# Patient Record
Sex: FEMALE | Race: BLACK OR AFRICAN AMERICAN | NOT HISPANIC OR LATINO
[De-identification: names, ages, dates, MRNs, and addresses within clinical notes are randomized per-mention and may not be internally consistent; named-entity substitution may affect disease eponyms.]

---

## 2017-08-10 ENCOUNTER — APPOINTMENT (OUTPATIENT)
Dept: SURGERY | Facility: CLINIC | Age: 72
End: 2017-08-10

## 2017-08-10 VITALS
WEIGHT: 200 LBS | OXYGEN SATURATION: 99 % | SYSTOLIC BLOOD PRESSURE: 154 MMHG | DIASTOLIC BLOOD PRESSURE: 70 MMHG | HEIGHT: 64 IN | HEART RATE: 73 BPM | BODY MASS INDEX: 34.15 KG/M2 | TEMPERATURE: 98.6 F

## 2020-01-09 ENCOUNTER — RESULT REVIEW (OUTPATIENT)
Age: 75
End: 2020-01-09

## 2021-05-19 ENCOUNTER — APPOINTMENT (OUTPATIENT)
Dept: OPHTHALMOLOGY | Facility: CLINIC | Age: 76
End: 2021-05-19

## 2021-09-14 ENCOUNTER — INPATIENT (INPATIENT)
Facility: HOSPITAL | Age: 76
LOS: 1 days | Discharge: HOME | DRG: 379 | End: 2021-09-16
Attending: STUDENT IN AN ORGANIZED HEALTH CARE EDUCATION/TRAINING PROGRAM | Admitting: INTERNAL MEDICINE
Payer: MEDICARE

## 2021-09-14 VITALS
WEIGHT: 186.95 LBS | HEIGHT: 64 IN | TEMPERATURE: 98 F | OXYGEN SATURATION: 100 % | SYSTOLIC BLOOD PRESSURE: 167 MMHG | HEART RATE: 82 BPM | RESPIRATION RATE: 16 BRPM | DIASTOLIC BLOOD PRESSURE: 79 MMHG

## 2021-09-14 DIAGNOSIS — E11.9 TYPE 2 DIABETES MELLITUS WITHOUT COMPLICATIONS: ICD-10-CM

## 2021-09-14 DIAGNOSIS — R63.8 OTHER SYMPTOMS AND SIGNS CONCERNING FOOD AND FLUID INTAKE: ICD-10-CM

## 2021-09-14 DIAGNOSIS — D64.9 ANEMIA, UNSPECIFIED: ICD-10-CM

## 2021-09-14 DIAGNOSIS — N18.9 CHRONIC KIDNEY DISEASE, UNSPECIFIED: ICD-10-CM

## 2021-09-14 DIAGNOSIS — K92.2 GASTROINTESTINAL HEMORRHAGE, UNSPECIFIED: ICD-10-CM

## 2021-09-14 DIAGNOSIS — I10 ESSENTIAL (PRIMARY) HYPERTENSION: ICD-10-CM

## 2021-09-14 LAB
ALBUMIN SERPL ELPH-MCNC: 3.5 G/DL — SIGNIFICANT CHANGE UP (ref 3.3–5)
ALP SERPL-CCNC: 57 U/L — SIGNIFICANT CHANGE UP (ref 40–120)
ALT FLD-CCNC: 16 U/L — SIGNIFICANT CHANGE UP (ref 10–45)
ANION GAP SERPL CALC-SCNC: 10 MMOL/L — SIGNIFICANT CHANGE UP (ref 5–17)
APTT BLD: 33.6 SEC — SIGNIFICANT CHANGE UP (ref 27.5–35.5)
AST SERPL-CCNC: 24 U/L — SIGNIFICANT CHANGE UP (ref 10–40)
BASOPHILS # BLD AUTO: 0.02 K/UL — SIGNIFICANT CHANGE UP (ref 0–0.2)
BASOPHILS NFR BLD AUTO: 0.2 % — SIGNIFICANT CHANGE UP (ref 0–2)
BILIRUB SERPL-MCNC: 0.2 MG/DL — SIGNIFICANT CHANGE UP (ref 0.2–1.2)
BLD GP AB SCN SERPL QL: NEGATIVE — SIGNIFICANT CHANGE UP
BUN SERPL-MCNC: 32 MG/DL — HIGH (ref 7–23)
CALCIUM SERPL-MCNC: 9 MG/DL — SIGNIFICANT CHANGE UP (ref 8.4–10.5)
CHLORIDE SERPL-SCNC: 108 MMOL/L — SIGNIFICANT CHANGE UP (ref 96–108)
CO2 SERPL-SCNC: 23 MMOL/L — SIGNIFICANT CHANGE UP (ref 22–31)
CREAT SERPL-MCNC: 2.16 MG/DL — HIGH (ref 0.5–1.3)
EOSINOPHIL # BLD AUTO: 0.11 K/UL — SIGNIFICANT CHANGE UP (ref 0–0.5)
EOSINOPHIL NFR BLD AUTO: 1.2 % — SIGNIFICANT CHANGE UP (ref 0–6)
GLUCOSE BLDC GLUCOMTR-MCNC: 239 MG/DL — HIGH (ref 70–99)
GLUCOSE SERPL-MCNC: 85 MG/DL — SIGNIFICANT CHANGE UP (ref 70–99)
HCT VFR BLD CALC: 32.3 % — LOW (ref 34.5–45)
HGB BLD-MCNC: 9.8 G/DL — LOW (ref 11.5–15.5)
IMM GRANULOCYTES NFR BLD AUTO: 0.3 % — SIGNIFICANT CHANGE UP (ref 0–1.5)
INR BLD: 0.95 — SIGNIFICANT CHANGE UP (ref 0.88–1.16)
LACTATE SERPL-SCNC: 1.3 MMOL/L — SIGNIFICANT CHANGE UP (ref 0.5–2)
LYMPHOCYTES # BLD AUTO: 2.02 K/UL — SIGNIFICANT CHANGE UP (ref 1–3.3)
LYMPHOCYTES # BLD AUTO: 21.1 % — SIGNIFICANT CHANGE UP (ref 13–44)
MCHC RBC-ENTMCNC: 26.7 PG — LOW (ref 27–34)
MCHC RBC-ENTMCNC: 30.3 GM/DL — LOW (ref 32–36)
MCV RBC AUTO: 88 FL — SIGNIFICANT CHANGE UP (ref 80–100)
MONOCYTES # BLD AUTO: 0.41 K/UL — SIGNIFICANT CHANGE UP (ref 0–0.9)
MONOCYTES NFR BLD AUTO: 4.3 % — SIGNIFICANT CHANGE UP (ref 2–14)
NEUTROPHILS # BLD AUTO: 6.97 K/UL — SIGNIFICANT CHANGE UP (ref 1.8–7.4)
NEUTROPHILS NFR BLD AUTO: 72.9 % — SIGNIFICANT CHANGE UP (ref 43–77)
NRBC # BLD: 0 /100 WBCS — SIGNIFICANT CHANGE UP (ref 0–0)
PLATELET # BLD AUTO: 325 K/UL — SIGNIFICANT CHANGE UP (ref 150–400)
POTASSIUM SERPL-MCNC: 4.7 MMOL/L — SIGNIFICANT CHANGE UP (ref 3.5–5.3)
POTASSIUM SERPL-SCNC: 4.7 MMOL/L — SIGNIFICANT CHANGE UP (ref 3.5–5.3)
PROT SERPL-MCNC: 6.6 G/DL — SIGNIFICANT CHANGE UP (ref 6–8.3)
PROTHROM AB SERPL-ACNC: 11.4 SEC — SIGNIFICANT CHANGE UP (ref 10.6–13.6)
RBC # BLD: 3.67 M/UL — LOW (ref 3.8–5.2)
RBC # FLD: 17 % — HIGH (ref 10.3–14.5)
RH IG SCN BLD-IMP: POSITIVE — SIGNIFICANT CHANGE UP
SARS-COV-2 RNA SPEC QL NAA+PROBE: NEGATIVE — SIGNIFICANT CHANGE UP
SODIUM SERPL-SCNC: 141 MMOL/L — SIGNIFICANT CHANGE UP (ref 135–145)
WBC # BLD: 9.56 K/UL — SIGNIFICANT CHANGE UP (ref 3.8–10.5)
WBC # FLD AUTO: 9.56 K/UL — SIGNIFICANT CHANGE UP (ref 3.8–10.5)

## 2021-09-14 PROCEDURE — 99222 1ST HOSP IP/OBS MODERATE 55: CPT

## 2021-09-14 PROCEDURE — 99285 EMERGENCY DEPT VISIT HI MDM: CPT

## 2021-09-14 PROCEDURE — 99223 1ST HOSP IP/OBS HIGH 75: CPT | Mod: GC

## 2021-09-14 PROCEDURE — 71045 X-RAY EXAM CHEST 1 VIEW: CPT | Mod: 26

## 2021-09-14 RX ORDER — DEXTROSE 50 % IN WATER 50 %
12.5 SYRINGE (ML) INTRAVENOUS ONCE
Refills: 0 | Status: DISCONTINUED | OUTPATIENT
Start: 2021-09-14 | End: 2021-09-16

## 2021-09-14 RX ORDER — DEXTROSE 50 % IN WATER 50 %
25 SYRINGE (ML) INTRAVENOUS ONCE
Refills: 0 | Status: DISCONTINUED | OUTPATIENT
Start: 2021-09-14 | End: 2021-09-16

## 2021-09-14 RX ORDER — DEXTROSE 50 % IN WATER 50 %
15 SYRINGE (ML) INTRAVENOUS ONCE
Refills: 0 | Status: DISCONTINUED | OUTPATIENT
Start: 2021-09-14 | End: 2021-09-16

## 2021-09-14 RX ORDER — INFLUENZA VIRUS VACCINE 15; 15; 15; 15 UG/.5ML; UG/.5ML; UG/.5ML; UG/.5ML
0.7 SUSPENSION INTRAMUSCULAR ONCE
Refills: 0 | Status: DISCONTINUED | OUTPATIENT
Start: 2021-09-14 | End: 2021-09-16

## 2021-09-14 RX ORDER — PANTOPRAZOLE SODIUM 20 MG/1
40 TABLET, DELAYED RELEASE ORAL EVERY 12 HOURS
Refills: 0 | Status: DISCONTINUED | OUTPATIENT
Start: 2021-09-15 | End: 2021-09-15

## 2021-09-14 RX ORDER — SODIUM CHLORIDE 9 MG/ML
1000 INJECTION, SOLUTION INTRAVENOUS
Refills: 0 | Status: DISCONTINUED | OUTPATIENT
Start: 2021-09-14 | End: 2021-09-16

## 2021-09-14 RX ORDER — INSULIN GLARGINE 100 [IU]/ML
28 INJECTION, SOLUTION SUBCUTANEOUS AT BEDTIME
Refills: 0 | Status: DISCONTINUED | OUTPATIENT
Start: 2021-09-14 | End: 2021-09-15

## 2021-09-14 RX ORDER — PANTOPRAZOLE SODIUM 20 MG/1
40 TABLET, DELAYED RELEASE ORAL ONCE
Refills: 0 | Status: COMPLETED | OUTPATIENT
Start: 2021-09-14 | End: 2021-09-14

## 2021-09-14 RX ORDER — GLUCAGON INJECTION, SOLUTION 0.5 MG/.1ML
1 INJECTION, SOLUTION SUBCUTANEOUS ONCE
Refills: 0 | Status: DISCONTINUED | OUTPATIENT
Start: 2021-09-14 | End: 2021-09-16

## 2021-09-14 RX ORDER — INFLUENZA VIRUS VACCINE 15; 15; 15; 15 UG/.5ML; UG/.5ML; UG/.5ML; UG/.5ML
0.5 SUSPENSION INTRAMUSCULAR ONCE
Refills: 0 | Status: DISCONTINUED | OUTPATIENT
Start: 2021-09-14 | End: 2021-09-14

## 2021-09-14 RX ORDER — PANTOPRAZOLE SODIUM 20 MG/1
8 TABLET, DELAYED RELEASE ORAL
Qty: 80 | Refills: 0 | Status: DISCONTINUED | OUTPATIENT
Start: 2021-09-14 | End: 2021-09-14

## 2021-09-14 RX ORDER — INSULIN LISPRO 100/ML
VIAL (ML) SUBCUTANEOUS
Refills: 0 | Status: DISCONTINUED | OUTPATIENT
Start: 2021-09-14 | End: 2021-09-16

## 2021-09-14 RX ORDER — SODIUM CHLORIDE 9 MG/ML
1000 INJECTION INTRAMUSCULAR; INTRAVENOUS; SUBCUTANEOUS
Refills: 0 | Status: COMPLETED | OUTPATIENT
Start: 2021-09-14 | End: 2021-09-14

## 2021-09-14 RX ADMIN — PANTOPRAZOLE SODIUM 40 MILLIGRAM(S): 20 TABLET, DELAYED RELEASE ORAL at 17:44

## 2021-09-14 RX ADMIN — SODIUM CHLORIDE 150 MILLILITER(S): 9 INJECTION INTRAMUSCULAR; INTRAVENOUS; SUBCUTANEOUS at 20:21

## 2021-09-14 RX ADMIN — INSULIN GLARGINE 28 UNIT(S): 100 INJECTION, SOLUTION SUBCUTANEOUS at 22:55

## 2021-09-14 RX ADMIN — PANTOPRAZOLE SODIUM 10 MG/HR: 20 TABLET, DELAYED RELEASE ORAL at 20:20

## 2021-09-14 RX ADMIN — Medication 4: at 22:55

## 2021-09-14 NOTE — H&P ADULT - ASSESSMENT
75 F PMHx DM, HTN, CKD Stage III, HLD, Osteoporosis presenting to [facility] as per outpatient Gastroenterologist, Dr Julio Cesar Cox for abnormal EGD/Cscope performed today, 9/14. She is being admitted for colonoscopy and further management of GI bleed.

## 2021-09-14 NOTE — H&P ADULT - ATTENDING COMMENTS
reviewed pertinent data , h&p  patient seen and examined overnight     pt in NAD, awake, alert, reported abdominal cramping and nausea in AM , mmm, s1s2, lungs cta b/l; abdomen soft/nt/nd; no lower ext edema/tenderness b/l     1. melena w/ concerning findings on outside EGD/ colonoscopy w/ increased polyposis compared to previous colonoscopy. denies melena o/n; monitor h/h; NPO o/n pending EGD. on IV PPI o/n. followup GI recs and path       rest of  plan as above

## 2021-09-14 NOTE — PROGRESS NOTE ADULT - ASSESSMENT
75 F PMHx DM, HTN, CKD Stage III, HLD, Osteoporosis presenting to [facility] as per outpatient Gastroenterologist, Dr Julio Cesar Cox for abnormal EGD/Cscope performed today, 9/14, findings concerning for malignancy in the setting of reported history of JOVANI, GI consulted for abnormal EGD/C-scope.    #Melena  #Anemia  Patient presenting with 1 month history of anemia, reported to JOVANI in outpatient setting in the setting of melena and recent unintentional weight loss. EGD/Cscope evaluation notable for polyposis in stomach and colon, highly suspicious for malignant process.   -February 2020 which was notable for a 1 cm ascending colon polyp that was removed but not retrieved as well as a 2 mm ascending colon tubular adenoma. Recalls that she was advised to have a repeat C-scope in 3 years time.  -EGD 9/14/2021: polyposis noted in cardia, fundus, in the greater curvature noted to have a semi-sessile/semi-flat, ulcerated mass of 3.0 cm with scant spontaneous hemorrhage along the greater curvature of gastric body. An attempt was made to raise the mass with submucosal saline injection, unsuccessful. Multiple biopsies obtained. Polyposis noted in gastric body, with approx 40 hemorrhagic appearing polyps ranging in size from 3 mm to 1.5 cm noted. Pedunculated 1.2 cm polyp removed by hot snare in antrum.   -C-scope 9/14/2021: Colorectal polyposis, mainly noted in transverse colon. Significant melena noted. Ascending colon lipoma noted.   -NPO after MN for repeat EGD to re-assess need for further hemostasis  -f/u pathology from outpatient GI provider, Dr Julio Cesar Cxo ([facility])   -Please obtain AM Coags  -Obtain Fe studies (Fe, TIBC, Transferrin, ferritin)  -Protonix 40 mg IVP BID     Case discussed with ED attending.    Nisreen Lopez DO  Gastroenterology Fellow  Pager: [phone] 75 F PMHx DM, HTN, CKD Stage III, HLD, Osteoporosis presenting to [facility] as per outpatient Gastroenterologist, Dr Julio Cesar Cox for abnormal EGD/Cscope performed today, 9/14, findings concerning for malignancy in the setting of reported history of JOVANI, GI consulted for abnormal EGD/C-scope.    #Melena  #Anemia  Patient presenting with 1 month history of anemia, reported to JOVANI in outpatient setting in the setting of melena and recent unintentional weight loss. EGD/Cscope evaluation notable for polyposis in stomach and colon, highly suspicious for malignant process.   -February 2020 which was notable for a 1 cm ascending colon polyp that was removed but not retrieved as well as a 2 mm ascending colon tubular adenoma. Recalls that she was advised to have a repeat C-scope in 3 years time.  -EGD 9/14/2021: polyposis noted in cardia, fundus, in the greater curvature noted to have a semi-sessile/semi-flat, ulcerated mass of 3.0 cm with scant spontaneous hemorrhage along the greater curvature of gastric body. An attempt was made to raise the mass with submucosal saline injection, unsuccessful. Multiple biopsies obtained. Polyposis noted in gastric body, with approx 40 hemorrhagic appearing polyps ranging in size from 3 mm to 1.5 cm noted. Pedunculated 1.2 cm polyp removed by hot snare in antrum.   -C-scope 9/14/2021: Colorectal polyposis, mainly noted in transverse colon. Significant melena noted. Ascending colon lipoma noted.   -NPO after MN for repeat EGD to re-assess need for further hemostasis  -f/u pathology from outpatient GI provider, Dr Julio Cesar Cox ([facility])   -Please obtain AM Coags  -Obtain Fe studies (Fe, TIBC, Transferrin, ferritin)  -Monitor H/H  -Transfusion goal as per primary team   -Maintain active T&S  -Protonix 40 mg IVP BID     Case discussed with ED attending.    Nisreen Lopez DO  Gastroenterology Fellow  Pager: [phone]

## 2021-09-14 NOTE — H&P ADULT - PROBLEM SELECTOR PLAN 6
F: none  E: replete prn  N: NPO after midnight  A: none   GI ppx: pantoprazole 40 mg ggt F: none  E: replete prn  N: NPO after midnight  A: none   GI ppx: pantoprazole 40 mg BID

## 2021-09-14 NOTE — H&P ADULT - PROBLEM SELECTOR PLAN 1
Pt has 1 month h/o melena and constipation; 3 month h/o Fe def anemia   -February 2020 which was notable for a 1 cm ascending colon polyp that was removed but not retrieved as well as a 2 mm ascending colon tubular adenoma.  -EGD 9/14/2021: polyposis noted in cardia, fundus, in the greater curvature noted to have a semi-sessile/semi-flat, ulcerated mass of 3.0 cm with scant spontaneous hemorrhage along the greater curvature of gastric body.  -C-scope 9/14/2021: Colorectal polyposis, mainly noted in transverse colon. Significant melena noted. Ascending colon lipoma noted.   - Hb 9.8    TO DO:   - Maintain active type and screen, trend Hb and transfuse prn   -NPO after MN for repeat EGD to re-assess need for further hemostasis  -f/u pathology from outpatient GI provider, Dr Julio Cesar Cox ([facility])   -f/u AM Coags, iron studies   -cw Protonix 40 mg IVP BID

## 2021-09-14 NOTE — H&P ADULT - NSHPSOCIALHISTORY_GEN_ALL_CORE
Pt lives with her daughter, has a smoking h/o in the past and quit smoking in 1994. She has no difficulty with her activities of daily living.

## 2021-09-14 NOTE — H&P ADULT - NSHPPHYSICALEXAM_GEN_ALL_CORE
PHYSICAL EXAM: Sitting comfortably on the bed and does not appear to be in any distress    Constitutional: WDWN  HEENT: NC/AT, non icteric sclera, PEERL  Neck: supple, no JVD  Respiratory: cta b/l  Cardiovascular: s1 s2 present, no murmurs   Gastrointestinal: soft, ND NT, BS x4  Extremities: pulses present b/l, no cyanosis, no edema, no clubbing  Neurological: AAO x3, no focal neurological deficit

## 2021-09-14 NOTE — ED PROVIDER NOTE - GASTROINTESTINAL, MLM
Rectal exam deferred due to images of melena accompanying pt from GI suite. Belly soft nontender, nondistended.  Rectal exam deferred due to images of melena accompanying pt from GI suite.

## 2021-09-14 NOTE — H&P ADULT - HISTORY OF PRESENT ILLNESS
75 F PMHx DM, HTN, CKD Stage III, HLD, Osteoporosis presenting to [facility] as per outpatient Gastroenterologist, Dr Julio Cesar Cox for abnormal EGD/Cscope performed today, 9/14. Patient's history dates back to approximately 3 months ago when she was first told she was iron deficient and noted to have a drop in her Hgb by hematologist Dr. Lino Oquendo. Patient does not recall what her Hgb level had dropped down to at the time but recalls receiving at least 2-3 iron infusions. Patient at the time denies any dark stools. Patient notes that leading up to that point, she had at least 3 colonoscopies before, the last of which was performed February 2020 which was notable for a 1 cm ascending colon polyp that was removed but not retrieved as well as a 2 mm ascending colon tubular adenoma. Recalls that she was advised to have a repeat C-scope in 3 years time. Approximately 1 month ago, she reports having dark/black stools, reported to be tarry in consistency. She states that she is consistently having black stool however, today her stool was relatively less black. She Denies any NSAID use, only reports taking ASA 81 mg daily and endorses chronic constipation. Around the same time, also reports having decreased PO intake and losing approximately 10 lbs, unintentional. Denies any associated abdominal pain, nausea/vomiting, early satiety.     Home meds: Atorvastatin 20mg daily, Auryxia 210 mg BID, Hydralazine 25 mg daily, Metoprolol 50 mg daily, Nifedipine 30 mg daily, Tresiba 56U daily, Novolog 8-10U before meals, Vit D3 1000U, Baby aspirin 81 mg, Vit C, Furosemide 40 mg every other day, losartan potassium 50 mg daily    In the ED:  Vitals: 167/79, HR 82, T 98.4, RR 16  Labs: Hb 9.8, WBC 9.56, Na 141, K 4.7, BUN 32, Cr 2.16, INR 0.95  CXR: The lungs are clear.    The patient was admitted for colonoscopic evaluation by GI.    9 75 F PMHx DM, HTN, CKD Stage III, HLD, Osteoporosis presenting to [facility] as per outpatient Gastroenterologist, Dr Julio Cesar Cox for abnormal EGD/Cscope performed today, 9/14. Patient's history dates back to approximately 3 months ago when she was first told she was iron deficient and noted to have a drop in her Hgb by hematologist Dr. Lino Oquendo. Patient does not recall what her Hgb level had dropped down to at the time but recalls receiving at least 2-3 iron infusions. Patient at the time denies any dark stools. Patient notes that leading up to that point, she had at least 3 colonoscopies before, the last of which was performed February 2020 which was notable for a 1 cm ascending colon polyp that was removed but not retrieved as well as a 2 mm ascending colon tubular adenoma. Recalls that she was advised to have a repeat C-scope in 3 years time. Approximately 1 month ago, she reports having dark/black stools, reported to be tarry in consistency. She states that she is consistently having black stool however, today her stool was relatively less black. She Denies any NSAID use, only reports taking ASA 81 mg daily and endorses chronic constipation. Around the same time, also reports having decreased PO intake and losing approximately 10 lbs, unintentional. Denies any associated abdominal pain, nausea/vomiting, early satiety.     Home meds: Atorvastatin 20mg daily, Auryxia 210 mg BID, Hydralazine 25 mg daily, Metoprolol 50 mg daily, Nifedipine 30 mg daily, Tresiba 56U daily, Novolog 8-10U before meals, Vit D3 1000U, Baby aspirin 81 mg, Vit C, Furosemide 40 mg every other day, losartan potassium 50 mg daily    In the ED:  Vitals: /79, HR 82, T 98.4, RR 16  Labs: Hb 9.8, WBC 9.56, Na 141, K 4.7, BUN 32, Cr 2.16, INR 0.95  CXR: The lungs are clear.    The patient was admitted for colonoscopic evaluation by GI.

## 2021-09-14 NOTE — H&P ADULT - NSHPLABSRESULTS_GEN_ALL_CORE
.  LABS:                         9.8    9.56  )-----------( 325      ( 14 Sep 2021 14:41 )             32.3     09-14    141  |  108  |  32<H>  ----------------------------<  85  4.7   |  23  |  2.16<H>    Ca    9.0      14 Sep 2021 14:40    TPro  6.6  /  Alb  3.5  /  TBili  0.2  /  DBili  x   /  AST  24  /  ALT  16  /  AlkPhos  57  09-14    PT/INR - ( 14 Sep 2021 14:40 )   PT: 11.4 sec;   INR: 0.95          PTT - ( 14 Sep 2021 14:40 )  PTT:33.6 sec          Lactate, Blood: 1.3 mmol/L (09-14 @ 14:40)      RADIOLOGY, EKG & ADDITIONAL TESTS: CXR: The lungs are clear.

## 2021-09-14 NOTE — H&P ADULT - PROBLEM SELECTOR PLAN 2
Pt is taking home Furosemide 40 mg every other day, losartan potassium 50 mg daily, Hydralazine 25 mg daily, Metoprolol 50 mg daily, Nifedipine 30 mg daily    TO DO:  - continue holding antihypertensives in the setting of GI bleed

## 2021-09-14 NOTE — ED PROVIDER NOTE - OBJECTIVE STATEMENT
76 y/o F w/ Hx of DM2, CKD, former smoker, anemia, sent from gastroenterology office after pt received endoscopy and colonoscopy today, noting significant melena in various parts of the the colon and small amounts of upper GI polyp bleeding. Pt denies symptoms including abd pain, N/V, fever, chills, weakness, SOB. Pt does not drink alcohol.

## 2021-09-14 NOTE — ED ADULT TRIAGE NOTE - CHIEF COMPLAINT QUOTE
Pt brought in by EMS from endoscopy/colonoscopy suite for confirmed acute gastrointestinal bleeding. Pt reports black tarry stool recently. Denies abdominal pain, n/v/d. Not on blood thinners.

## 2021-09-14 NOTE — ED ADULT NURSE NOTE - OBJECTIVE STATEMENT
pt presents to ED via ambulance on stretcher for confirmed GI bleed.  Pt is coming from having endoscopy and colonscopy completed today at outpatient.  Pt reports having endoscopy due to dark tarry stools she has been having for the last month.  PT reports having some generalized weakness with it.  Pt denies any abd pain, nausea, vomiting, diarrhea, cp, sob.  No hx of GI bleed. Pt is not on blood thinners.

## 2021-09-14 NOTE — H&P ADULT - PROBLEM SELECTOR PLAN 5
Pt presented with Hb 9.8 and has a h/o and treatment for iron def anemia outpatient.    TO DO:   - Maintain active type and screen, trend Hb and transfuse prn

## 2021-09-15 ENCOUNTER — TRANSCRIPTION ENCOUNTER (OUTPATIENT)
Age: 76
End: 2021-09-15

## 2021-09-15 DIAGNOSIS — Z98.51 TUBAL LIGATION STATUS: Chronic | ICD-10-CM

## 2021-09-15 DIAGNOSIS — Z90.49 ACQUIRED ABSENCE OF OTHER SPECIFIED PARTS OF DIGESTIVE TRACT: Chronic | ICD-10-CM

## 2021-09-15 LAB
A1C WITH ESTIMATED AVERAGE GLUCOSE RESULT: 5.9 % — HIGH (ref 4–5.6)
ANION GAP SERPL CALC-SCNC: 8 MMOL/L — SIGNIFICANT CHANGE UP (ref 5–17)
APTT BLD: 30.7 SEC — SIGNIFICANT CHANGE UP (ref 27.5–35.5)
BASOPHILS # BLD AUTO: 0.02 K/UL — SIGNIFICANT CHANGE UP (ref 0–0.2)
BASOPHILS NFR BLD AUTO: 0.3 % — SIGNIFICANT CHANGE UP (ref 0–2)
BLD GP AB SCN SERPL QL: NEGATIVE — SIGNIFICANT CHANGE UP
BUN SERPL-MCNC: 31 MG/DL — HIGH (ref 7–23)
CALCIUM SERPL-MCNC: 8.7 MG/DL — SIGNIFICANT CHANGE UP (ref 8.4–10.5)
CHLORIDE SERPL-SCNC: 115 MMOL/L — HIGH (ref 96–108)
CO2 SERPL-SCNC: 24 MMOL/L — SIGNIFICANT CHANGE UP (ref 22–31)
COVID-19 SPIKE DOMAIN AB INTERP: POSITIVE
COVID-19 SPIKE DOMAIN ANTIBODY RESULT: 198 U/ML — HIGH
CREAT SERPL-MCNC: 2.02 MG/DL — HIGH (ref 0.5–1.3)
EOSINOPHIL # BLD AUTO: 0.14 K/UL — SIGNIFICANT CHANGE UP (ref 0–0.5)
EOSINOPHIL NFR BLD AUTO: 1.8 % — SIGNIFICANT CHANGE UP (ref 0–6)
ESTIMATED AVERAGE GLUCOSE: 123 MG/DL — HIGH (ref 68–114)
FERRITIN SERPL-MCNC: 157 NG/ML — HIGH (ref 15–150)
GLUCOSE BLDC GLUCOMTR-MCNC: 103 MG/DL — HIGH (ref 70–99)
GLUCOSE BLDC GLUCOMTR-MCNC: 107 MG/DL — HIGH (ref 70–99)
GLUCOSE BLDC GLUCOMTR-MCNC: 118 MG/DL — HIGH (ref 70–99)
GLUCOSE BLDC GLUCOMTR-MCNC: 167 MG/DL — HIGH (ref 70–99)
GLUCOSE BLDC GLUCOMTR-MCNC: 286 MG/DL — HIGH (ref 70–99)
GLUCOSE BLDC GLUCOMTR-MCNC: 63 MG/DL — LOW (ref 70–99)
GLUCOSE BLDC GLUCOMTR-MCNC: 69 MG/DL — LOW (ref 70–99)
GLUCOSE SERPL-MCNC: 90 MG/DL — SIGNIFICANT CHANGE UP (ref 70–99)
HCT VFR BLD CALC: 28.2 % — LOW (ref 34.5–45)
HGB BLD-MCNC: 8.4 G/DL — LOW (ref 11.5–15.5)
IMM GRANULOCYTES NFR BLD AUTO: 0.8 % — SIGNIFICANT CHANGE UP (ref 0–1.5)
INR BLD: 0.89 — SIGNIFICANT CHANGE UP (ref 0.88–1.16)
IRON SATN MFR SERPL: 35 % — SIGNIFICANT CHANGE UP (ref 14–50)
IRON SATN MFR SERPL: 49 UG/DL — SIGNIFICANT CHANGE UP (ref 30–160)
LYMPHOCYTES # BLD AUTO: 1.25 K/UL — SIGNIFICANT CHANGE UP (ref 1–3.3)
LYMPHOCYTES # BLD AUTO: 16.1 % — SIGNIFICANT CHANGE UP (ref 13–44)
MAGNESIUM SERPL-MCNC: 2.1 MG/DL — SIGNIFICANT CHANGE UP (ref 1.6–2.6)
MCHC RBC-ENTMCNC: 26.4 PG — LOW (ref 27–34)
MCHC RBC-ENTMCNC: 29.8 GM/DL — LOW (ref 32–36)
MCV RBC AUTO: 88.7 FL — SIGNIFICANT CHANGE UP (ref 80–100)
MONOCYTES # BLD AUTO: 0.54 K/UL — SIGNIFICANT CHANGE UP (ref 0–0.9)
MONOCYTES NFR BLD AUTO: 7 % — SIGNIFICANT CHANGE UP (ref 2–14)
NEUTROPHILS # BLD AUTO: 5.73 K/UL — SIGNIFICANT CHANGE UP (ref 1.8–7.4)
NEUTROPHILS NFR BLD AUTO: 74 % — SIGNIFICANT CHANGE UP (ref 43–77)
NRBC # BLD: 0 /100 WBCS — SIGNIFICANT CHANGE UP (ref 0–0)
PHOSPHATE SERPL-MCNC: 4.1 MG/DL — SIGNIFICANT CHANGE UP (ref 2.5–4.5)
PLATELET # BLD AUTO: 274 K/UL — SIGNIFICANT CHANGE UP (ref 150–400)
POTASSIUM SERPL-MCNC: 5 MMOL/L — SIGNIFICANT CHANGE UP (ref 3.5–5.3)
POTASSIUM SERPL-SCNC: 5 MMOL/L — SIGNIFICANT CHANGE UP (ref 3.5–5.3)
PROTHROM AB SERPL-ACNC: 10.7 SEC — SIGNIFICANT CHANGE UP (ref 10.6–13.6)
RBC # BLD: 3.18 M/UL — LOW (ref 3.8–5.2)
RBC # FLD: 16.9 % — HIGH (ref 10.3–14.5)
RH IG SCN BLD-IMP: POSITIVE — SIGNIFICANT CHANGE UP
SARS-COV-2 IGG+IGM SERPL QL IA: 198 U/ML — HIGH
SARS-COV-2 IGG+IGM SERPL QL IA: POSITIVE
SODIUM SERPL-SCNC: 147 MMOL/L — HIGH (ref 135–145)
TIBC SERPL-MCNC: 139 UG/DL — LOW (ref 220–430)
TRANSFERRIN SERPL-MCNC: 120 MG/DL — LOW (ref 200–360)
UIBC SERPL-MCNC: 90 UG/DL — LOW (ref 110–370)
WBC # BLD: 7.74 K/UL — SIGNIFICANT CHANGE UP (ref 3.8–10.5)
WBC # FLD AUTO: 7.74 K/UL — SIGNIFICANT CHANGE UP (ref 3.8–10.5)

## 2021-09-15 PROCEDURE — 43239 EGD BIOPSY SINGLE/MULTIPLE: CPT

## 2021-09-15 PROCEDURE — 99233 SBSQ HOSP IP/OBS HIGH 50: CPT | Mod: GC

## 2021-09-15 RX ORDER — HYDRALAZINE HCL 50 MG
25 TABLET ORAL EVERY 12 HOURS
Refills: 0 | Status: DISCONTINUED | OUTPATIENT
Start: 2021-09-15 | End: 2021-09-16

## 2021-09-15 RX ORDER — NIFEDIPINE 30 MG
1 TABLET, EXTENDED RELEASE 24 HR ORAL
Qty: 0 | Refills: 0 | DISCHARGE

## 2021-09-15 RX ORDER — DEXTROSE 50 % IN WATER 50 %
25 SYRINGE (ML) INTRAVENOUS ONCE
Refills: 0 | Status: COMPLETED | OUTPATIENT
Start: 2021-09-15 | End: 2021-09-15

## 2021-09-15 RX ORDER — INSULIN LISPRO 100/ML
5 VIAL (ML) SUBCUTANEOUS
Refills: 0 | Status: DISCONTINUED | OUTPATIENT
Start: 2021-09-15 | End: 2021-09-16

## 2021-09-15 RX ORDER — DEXTROSE 50 % IN WATER 50 %
25 SYRINGE (ML) INTRAVENOUS ONCE
Refills: 0 | Status: DISCONTINUED | OUTPATIENT
Start: 2021-09-15 | End: 2021-09-15

## 2021-09-15 RX ORDER — FUROSEMIDE 40 MG
0 TABLET ORAL
Qty: 0 | Refills: 0 | DISCHARGE

## 2021-09-15 RX ORDER — ACETAMINOPHEN 500 MG
650 TABLET ORAL ONCE
Refills: 0 | Status: COMPLETED | OUTPATIENT
Start: 2021-09-15 | End: 2021-09-15

## 2021-09-15 RX ORDER — INSULIN DEGLUDEC 100 U/ML
0 INJECTION, SOLUTION SUBCUTANEOUS
Qty: 0 | Refills: 0 | DISCHARGE

## 2021-09-15 RX ORDER — METOPROLOL TARTRATE 50 MG
1 TABLET ORAL
Qty: 0 | Refills: 0 | DISCHARGE

## 2021-09-15 RX ORDER — NIFEDIPINE 30 MG
60 TABLET, EXTENDED RELEASE 24 HR ORAL EVERY 24 HOURS
Refills: 0 | Status: DISCONTINUED | OUTPATIENT
Start: 2021-09-16 | End: 2021-09-16

## 2021-09-15 RX ORDER — CHOLECALCIFEROL (VITAMIN D3) 125 MCG
1 CAPSULE ORAL
Qty: 0 | Refills: 0 | DISCHARGE

## 2021-09-15 RX ORDER — INSULIN GLARGINE 100 [IU]/ML
20 INJECTION, SOLUTION SUBCUTANEOUS AT BEDTIME
Refills: 0 | Status: DISCONTINUED | OUTPATIENT
Start: 2021-09-15 | End: 2021-09-16

## 2021-09-15 RX ORDER — PANTOPRAZOLE SODIUM 20 MG/1
40 TABLET, DELAYED RELEASE ORAL EVERY 24 HOURS
Refills: 0 | Status: DISCONTINUED | OUTPATIENT
Start: 2021-09-16 | End: 2021-09-16

## 2021-09-15 RX ORDER — INSULIN ASPART 100 [IU]/ML
0 INJECTION, SOLUTION SUBCUTANEOUS
Qty: 0 | Refills: 0 | DISCHARGE

## 2021-09-15 RX ORDER — NIFEDIPINE 30 MG
30 TABLET, EXTENDED RELEASE 24 HR ORAL ONCE
Refills: 0 | Status: COMPLETED | OUTPATIENT
Start: 2021-09-15 | End: 2021-09-15

## 2021-09-15 RX ORDER — ONDANSETRON 8 MG/1
4 TABLET, FILM COATED ORAL ONCE
Refills: 0 | Status: COMPLETED | OUTPATIENT
Start: 2021-09-15 | End: 2021-09-15

## 2021-09-15 RX ORDER — HYDRALAZINE/HYDROCHLOROTHIAZID 50 MG-50MG
0 CAPSULE ORAL
Qty: 0 | Refills: 0 | DISCHARGE

## 2021-09-15 RX ORDER — HYDRALAZINE HCL 50 MG
25 TABLET ORAL EVERY 24 HOURS
Refills: 0 | Status: DISCONTINUED | OUTPATIENT
Start: 2021-09-15 | End: 2021-09-15

## 2021-09-15 RX ORDER — NIFEDIPINE 30 MG
30 TABLET, EXTENDED RELEASE 24 HR ORAL EVERY 24 HOURS
Refills: 0 | Status: DISCONTINUED | OUTPATIENT
Start: 2021-09-15 | End: 2021-09-15

## 2021-09-15 RX ORDER — ASCORBIC ACID 60 MG
0 TABLET,CHEWABLE ORAL
Qty: 0 | Refills: 0 | DISCHARGE

## 2021-09-15 RX ADMIN — Medication 650 MILLIGRAM(S): at 09:35

## 2021-09-15 RX ADMIN — Medication 30 MILLIGRAM(S): at 16:52

## 2021-09-15 RX ADMIN — Medication 650 MILLIGRAM(S): at 09:06

## 2021-09-15 RX ADMIN — Medication 5 UNIT(S): at 17:34

## 2021-09-15 RX ADMIN — Medication 2: at 22:36

## 2021-09-15 RX ADMIN — Medication 25 MILLILITER(S): at 09:06

## 2021-09-15 RX ADMIN — Medication 30 MILLIGRAM(S): at 18:34

## 2021-09-15 RX ADMIN — Medication 6: at 17:33

## 2021-09-15 RX ADMIN — PANTOPRAZOLE SODIUM 40 MILLIGRAM(S): 20 TABLET, DELAYED RELEASE ORAL at 06:44

## 2021-09-15 RX ADMIN — ONDANSETRON 4 MILLIGRAM(S): 8 TABLET, FILM COATED ORAL at 06:44

## 2021-09-15 RX ADMIN — INSULIN GLARGINE 20 UNIT(S): 100 INJECTION, SOLUTION SUBCUTANEOUS at 22:37

## 2021-09-15 RX ADMIN — Medication 25 MILLIGRAM(S): at 18:21

## 2021-09-15 RX ADMIN — Medication 25 MILLILITER(S): at 13:12

## 2021-09-15 NOTE — PROGRESS NOTE ADULT - PROBLEM SELECTOR PLAN 4
Pt takes home Tresiba 56U daily, Novolog 8-10U before meals. A1c 5.9 well controlled    TO DO:  - pt was NPO for 48 hrs  - lantus 28U was likely too high for her and she was hypoglycemic this morning  - decrease to 20U lantus qd for now until on a diet again  - mISS

## 2021-09-15 NOTE — CHART NOTE - NSCHARTNOTEFT_GEN_A_CORE
EGD performed with Dr Lopez and myself, findings as noted below:    Impression:  -Normal esophagus.    -Many mixed non-bleeding polyps of multilobular appearance ranging in size from 3 mm to 15 mm were found in the whole stomach.   -Site of recent polypectomy visualized, no stigmata of recent bleeding noted.  -Gastroscope not advanced to duodenum given above noted complication of hypoxia. On outpatient EGD dated 9/14/2021, no lesions or active bleeding noted in duodenum.    Plan:  -Resume Previous Diet  -Monitor H/H  -Follow up pathology from outpatient gastroenterologist's office, Dr Julio Cesar Cox  -Protonix 40 mg po daily? ?

## 2021-09-15 NOTE — PROVIDER CONTACT NOTE (HYPOGLYCEMIA EVENT) - NS PROVIDER CONTACT BACKGROUND-HYPO
Age: 75y    Gender: Female    POCT Blood Glucose:  63 mg/dL (09-15-21 @ 12:40)  118 mg/dL (09-15-21 @ 09:31)  69 mg/dL (09-15-21 @ 08:25)  103 mg/dL (09-15-21 @ 06:24)  239 mg/dL (09-14-21 @ 22:51)  84 mg/dL (09-14-21 @ 13:59)      eMAR:  dextrose 50% Injectable   25 milliLiter(s) IV Push (09-15-21 @ 09:06)    dextrose 50% Injectable   25 milliLiter(s) IV Push (09-15-21 @ 13:12)    insulin glargine Injectable (LANTUS)   28 Unit(s) SubCutaneous (09-14-21 @ 22:55)    insulin lispro (ADMELOG) corrective regimen sliding scale   4 Unit(s) SubCutaneous (09-14-21 @ 22:55)    
Age: 75y    Gender: Female    POCT Blood Glucose:  69 mg/dL (09-15-21 @ 08:25)  103 mg/dL (09-15-21 @ 06:24)  239 mg/dL (09-14-21 @ 22:51)  84 mg/dL (09-14-21 @ 13:59)      eMAR:  insulin glargine Injectable (LANTUS)   28 Unit(s) SubCutaneous (09-14-21 @ 22:55)    insulin lispro (ADMELOG) corrective regimen sliding scale   4 Unit(s) SubCutaneous (09-14-21 @ 22:55)

## 2021-09-15 NOTE — PROGRESS NOTE ADULT - PROBLEM SELECTOR PLAN 1
Pt has 1 month h/o melena and constipation; 3 month h/o Fe def anemia   -February 2020 which was notable for a 1 cm ascending colon polyp that was removed but not retrieved as well as a 2 mm ascending colon tubular adenoma.  -EGD 9/14/2021: polyposis noted in cardia, fundus, in the greater curvature noted to have a semi-sessile/semi-flat, ulcerated mass of 3.0 cm with scant spontaneous hemorrhage along the greater curvature of gastric body.  -C-scope 9/14/2021: Colorectal polyposis, mainly noted in transverse colon. Significant melena noted. Ascending colon lipoma noted.   - Hb 9.8 on admission  - now Hgb 8.4 - continue to monitor    TO DO:   - Maintain active type and screen, trend Hb and transfuse prn <7  -NPO after MN for repeat EGD to re-assess need for further hemostasis  -f/u pathology from outpatient GI provider, Dr Julio Cesar Cox ([facility])   -cw Protonix 40 mg IVP BID

## 2021-09-15 NOTE — PROGRESS NOTE ADULT - ASSESSMENT
75 F PMHx DM, HTN, CKD Stage III, HLD, Osteoporosis presenting to [facility] as per outpatient Gastroenterologist, Dr Julio Cesar Cox for abnormal EGD/Cscope performed today, 9/14. She is being admitted for colonoscopy and further management of GI bleed.

## 2021-09-15 NOTE — PROVIDER CONTACT NOTE (HYPOGLYCEMIA EVENT) - NS PROVIDER CONTACT CONTRIBUTING FACTORS OF EPISODE
NPO/Other (Specify)
s/o NPO
Ji Virk), Physician  NPs  [address] [place] [id number]  Phone: [phone]  Fax: [phone]

## 2021-09-15 NOTE — PROGRESS NOTE ADULT - PROBLEM SELECTOR PLAN 2
Pt is taking home Furosemide 40 mg every other day, losartan potassium 50 mg daily, Hydralazine 25 mg daily, Metoprolol 50 mg daily, Nifedipine 30 mg daily    TO DO:  - BP elevated today 180s/70-80s - likely due to holding BP meds for past 2 days   - post scope will add back BP meds as BP can tolerate  - can restart nifedipine 30mg and hydralazine 25mg qd if still hypertensive after scope

## 2021-09-15 NOTE — PROGRESS NOTE ADULT - PROBLEM SELECTOR PLAN 6
F: none  E: replete prn  N: NPO after midnight for procedure  A: none   GI ppx: pantoprazole 40 mg BID

## 2021-09-16 ENCOUNTER — TRANSCRIPTION ENCOUNTER (OUTPATIENT)
Age: 76
End: 2021-09-16

## 2021-09-16 VITALS
DIASTOLIC BLOOD PRESSURE: 79 MMHG | OXYGEN SATURATION: 98 % | HEART RATE: 77 BPM | RESPIRATION RATE: 18 BRPM | SYSTOLIC BLOOD PRESSURE: 161 MMHG | TEMPERATURE: 98 F

## 2021-09-16 LAB
ALBUMIN SERPL ELPH-MCNC: 3 G/DL — LOW (ref 3.3–5)
ALP SERPL-CCNC: 63 U/L — SIGNIFICANT CHANGE UP (ref 40–120)
ALT FLD-CCNC: 11 U/L — SIGNIFICANT CHANGE UP (ref 10–45)
ANION GAP SERPL CALC-SCNC: 7 MMOL/L — SIGNIFICANT CHANGE UP (ref 5–17)
AST SERPL-CCNC: 18 U/L — SIGNIFICANT CHANGE UP (ref 10–40)
BASOPHILS # BLD AUTO: 0.02 K/UL — SIGNIFICANT CHANGE UP (ref 0–0.2)
BASOPHILS NFR BLD AUTO: 0.2 % — SIGNIFICANT CHANGE UP (ref 0–2)
BILIRUB SERPL-MCNC: 0.2 MG/DL — SIGNIFICANT CHANGE UP (ref 0.2–1.2)
BUN SERPL-MCNC: 28 MG/DL — HIGH (ref 7–23)
CALCIUM SERPL-MCNC: 9.1 MG/DL — SIGNIFICANT CHANGE UP (ref 8.4–10.5)
CHLORIDE SERPL-SCNC: 113 MMOL/L — HIGH (ref 96–108)
CO2 SERPL-SCNC: 23 MMOL/L — SIGNIFICANT CHANGE UP (ref 22–31)
CREAT SERPL-MCNC: 2.12 MG/DL — HIGH (ref 0.5–1.3)
EOSINOPHIL # BLD AUTO: 0.01 K/UL — SIGNIFICANT CHANGE UP (ref 0–0.5)
EOSINOPHIL NFR BLD AUTO: 0.1 % — SIGNIFICANT CHANGE UP (ref 0–6)
GLUCOSE BLDC GLUCOMTR-MCNC: 121 MG/DL — HIGH (ref 70–99)
GLUCOSE BLDC GLUCOMTR-MCNC: 98 MG/DL — SIGNIFICANT CHANGE UP (ref 70–99)
GLUCOSE SERPL-MCNC: 130 MG/DL — HIGH (ref 70–99)
HCT VFR BLD CALC: 30.8 % — LOW (ref 34.5–45)
HCT VFR BLD CALC: 32.9 % — LOW (ref 34.5–45)
HGB BLD-MCNC: 9.3 G/DL — LOW (ref 11.5–15.5)
HGB BLD-MCNC: 9.9 G/DL — LOW (ref 11.5–15.5)
IMM GRANULOCYTES NFR BLD AUTO: 0.4 % — SIGNIFICANT CHANGE UP (ref 0–1.5)
LYMPHOCYTES # BLD AUTO: 1.52 K/UL — SIGNIFICANT CHANGE UP (ref 1–3.3)
LYMPHOCYTES # BLD AUTO: 12 % — LOW (ref 13–44)
MAGNESIUM SERPL-MCNC: 2 MG/DL — SIGNIFICANT CHANGE UP (ref 1.6–2.6)
MCHC RBC-ENTMCNC: 26.5 PG — LOW (ref 27–34)
MCHC RBC-ENTMCNC: 26.6 PG — LOW (ref 27–34)
MCHC RBC-ENTMCNC: 30.1 GM/DL — LOW (ref 32–36)
MCHC RBC-ENTMCNC: 30.2 GM/DL — LOW (ref 32–36)
MCV RBC AUTO: 88.2 FL — SIGNIFICANT CHANGE UP (ref 80–100)
MCV RBC AUTO: 88.3 FL — SIGNIFICANT CHANGE UP (ref 80–100)
MONOCYTES # BLD AUTO: 0.75 K/UL — SIGNIFICANT CHANGE UP (ref 0–0.9)
MONOCYTES NFR BLD AUTO: 5.9 % — SIGNIFICANT CHANGE UP (ref 2–14)
NEUTROPHILS # BLD AUTO: 10.31 K/UL — HIGH (ref 1.8–7.4)
NEUTROPHILS NFR BLD AUTO: 81.4 % — HIGH (ref 43–77)
NRBC # BLD: 0 /100 WBCS — SIGNIFICANT CHANGE UP (ref 0–0)
NRBC # BLD: 0 /100 WBCS — SIGNIFICANT CHANGE UP (ref 0–0)
PHOSPHATE SERPL-MCNC: 3.1 MG/DL — SIGNIFICANT CHANGE UP (ref 2.5–4.5)
PLATELET # BLD AUTO: 276 K/UL — SIGNIFICANT CHANGE UP (ref 150–400)
PLATELET # BLD AUTO: 315 K/UL — SIGNIFICANT CHANGE UP (ref 150–400)
POTASSIUM SERPL-MCNC: 4.9 MMOL/L — SIGNIFICANT CHANGE UP (ref 3.5–5.3)
POTASSIUM SERPL-SCNC: 4.9 MMOL/L — SIGNIFICANT CHANGE UP (ref 3.5–5.3)
PROT SERPL-MCNC: 6.2 G/DL — SIGNIFICANT CHANGE UP (ref 6–8.3)
RBC # BLD: 3.49 M/UL — LOW (ref 3.8–5.2)
RBC # BLD: 3.73 M/UL — LOW (ref 3.8–5.2)
RBC # FLD: 17 % — HIGH (ref 10.3–14.5)
RBC # FLD: 17.2 % — HIGH (ref 10.3–14.5)
SODIUM SERPL-SCNC: 143 MMOL/L — SIGNIFICANT CHANGE UP (ref 135–145)
WBC # BLD: 12.65 K/UL — HIGH (ref 3.8–10.5)
WBC # BLD: 12.66 K/UL — HIGH (ref 3.8–10.5)
WBC # FLD AUTO: 12.65 K/UL — HIGH (ref 3.8–10.5)
WBC # FLD AUTO: 12.66 K/UL — HIGH (ref 3.8–10.5)

## 2021-09-16 PROCEDURE — 85730 THROMBOPLASTIN TIME PARTIAL: CPT

## 2021-09-16 PROCEDURE — 83540 ASSAY OF IRON: CPT

## 2021-09-16 PROCEDURE — 86901 BLOOD TYPING SEROLOGIC RH(D): CPT

## 2021-09-16 PROCEDURE — 82728 ASSAY OF FERRITIN: CPT

## 2021-09-16 PROCEDURE — 83550 IRON BINDING TEST: CPT

## 2021-09-16 PROCEDURE — 86850 RBC ANTIBODY SCREEN: CPT

## 2021-09-16 PROCEDURE — 85027 COMPLETE CBC AUTOMATED: CPT

## 2021-09-16 PROCEDURE — 84100 ASSAY OF PHOSPHORUS: CPT

## 2021-09-16 PROCEDURE — 36415 COLL VENOUS BLD VENIPUNCTURE: CPT

## 2021-09-16 PROCEDURE — 83735 ASSAY OF MAGNESIUM: CPT

## 2021-09-16 PROCEDURE — 85610 PROTHROMBIN TIME: CPT

## 2021-09-16 PROCEDURE — 99231 SBSQ HOSP IP/OBS SF/LOW 25: CPT

## 2021-09-16 PROCEDURE — 85025 COMPLETE CBC W/AUTO DIFF WBC: CPT

## 2021-09-16 PROCEDURE — 83036 HEMOGLOBIN GLYCOSYLATED A1C: CPT

## 2021-09-16 PROCEDURE — 84466 ASSAY OF TRANSFERRIN: CPT

## 2021-09-16 PROCEDURE — 80048 BASIC METABOLIC PNL TOTAL CA: CPT

## 2021-09-16 PROCEDURE — 86769 SARS-COV-2 COVID-19 ANTIBODY: CPT

## 2021-09-16 PROCEDURE — 96374 THER/PROPH/DIAG INJ IV PUSH: CPT

## 2021-09-16 PROCEDURE — 83605 ASSAY OF LACTIC ACID: CPT

## 2021-09-16 PROCEDURE — 99285 EMERGENCY DEPT VISIT HI MDM: CPT

## 2021-09-16 PROCEDURE — 80053 COMPREHEN METABOLIC PANEL: CPT

## 2021-09-16 PROCEDURE — 87635 SARS-COV-2 COVID-19 AMP PRB: CPT

## 2021-09-16 PROCEDURE — 82962 GLUCOSE BLOOD TEST: CPT

## 2021-09-16 PROCEDURE — 71045 X-RAY EXAM CHEST 1 VIEW: CPT

## 2021-09-16 PROCEDURE — 86900 BLOOD TYPING SEROLOGIC ABO: CPT

## 2021-09-16 RX ORDER — PANTOPRAZOLE SODIUM 20 MG/1
1 TABLET, DELAYED RELEASE ORAL
Qty: 30 | Refills: 0
Start: 2021-09-16 | End: 2021-10-15

## 2021-09-16 RX ORDER — ASPIRIN/CALCIUM CARB/MAGNESIUM 324 MG
0 TABLET ORAL
Qty: 0 | Refills: 0 | DISCHARGE

## 2021-09-16 RX ADMIN — PANTOPRAZOLE SODIUM 40 MILLIGRAM(S): 20 TABLET, DELAYED RELEASE ORAL at 06:42

## 2021-09-16 RX ADMIN — Medication 5 UNIT(S): at 08:53

## 2021-09-16 RX ADMIN — Medication 25 MILLIGRAM(S): at 06:39

## 2021-09-16 RX ADMIN — Medication 5 UNIT(S): at 12:12

## 2021-09-16 NOTE — DISCHARGE NOTE PROVIDER - CARE PROVIDER_API CALL
Julio Cesar Cox)  Gastroenterology; Internal Medicine  [address] [place] [address]  Phone: [phone]  Fax: [phone]  Established Patient  Follow Up Time: 2 weeks    Kirk Potter  [facility]  [phone] [place] [address]  Phone: [phone]  Fax: [phone]  Established Patient  Scheduled Appointment: 09/20/2021 09:00 AM

## 2021-09-16 NOTE — DISCHARGE NOTE PROVIDER - PROVIDER TOKENS
PROVIDER:[TOKEN:[[id number]],FOLLOWUP:[2 weeks],ESTABLISHEDPATIENT:[T]],PROVIDER:[TOKEN:[[id number]],SCHEDULEDAPPT:[09/20/2021],SCHEDULEDAPPTTIME:[09:00 AM],ESTABLISHEDPATIENT:[T]]

## 2021-09-16 NOTE — PROGRESS NOTE ADULT - SUBJECTIVE AND OBJECTIVE BOX
GASTROENTEROLOGY PROGRESS NOTE  Patient seen and examined at bedside. AM Hgb stable at 9.3. EGD performed 9/15, see chart note dated 9/15.     ROS: Patient denies any BMs since outpatient EGD/C-scope. No abdominal pain, nausea/vomiting.     PERTINENT REVIEW OF SYSTEMS:  CONSTITUTIONAL: No weakness, fevers or chills  HEENT: No visual changes; No vertigo or throat pain   GASTROINTESTINAL: As above.  NEUROLOGICAL: No numbness or weakness  SKIN: No itching, burning, rashes, or lesions     Allergies    sulfa drugs (Unknown)    Intolerances      MEDICATIONS:  MEDICATIONS  (STANDING):  dextrose 40% Gel 15 Gram(s) Oral once  dextrose 5%. 1000 milliLiter(s) (50 mL/Hr) IV Continuous <Continuous>  dextrose 5%. 1000 milliLiter(s) (100 mL/Hr) IV Continuous <Continuous>  dextrose 50% Injectable 25 Gram(s) IV Push once  dextrose 50% Injectable 12.5 Gram(s) IV Push once  dextrose 50% Injectable 25 Gram(s) IV Push once  glucagon  Injectable 1 milliGRAM(s) IntraMuscular once  hydrALAZINE 25 milliGRAM(s) Oral every 12 hours  influenza  Vaccine (HIGH DOSE) 0.7 milliLiter(s) IntraMuscular once  insulin glargine Injectable (LANTUS) 20 Unit(s) SubCutaneous at bedtime  insulin lispro (ADMELOG) corrective regimen sliding scale   SubCutaneous Before meals and at bedtime  insulin lispro Injectable (ADMELOG) 5 Unit(s) SubCutaneous three times a day before meals  NIFEdipine XL 60 milliGRAM(s) Oral every 24 hours  pantoprazole  Injectable 40 milliGRAM(s) IV Push every 24 hours    MEDICATIONS  (PRN):    Vital Signs Last 24 Hrs  T(C): 36.5 (16 Sep 2021 16:15), Max: 36.7 (15 Sep 2021 21:22)  T(F): 97.7 (16 Sep 2021 16:15), Max: 98 (15 Sep 2021 21:22)  HR: 77 (16 Sep 2021 16:15) (67 - 84)  BP: 161/79 (16 Sep 2021 16:15) (126/71 - 174/77)  BP(mean): --  RR: 18 (16 Sep 2021 16:15) (18 - 19)  SpO2: 98% (16 Sep 2021 16:15) (98% - 100%)    PHYSICAL EXAM:  General: Obese  female, lying comfortably in bed; in no acute distress; daughters present at bedside  HEENT: MMM, conjunctiva and sclera clear  Gastrointestinal: Soft, non-tender non-distended; Normal bowel sounds; No rebound or guarding  Extremities: Normal range of motion, No clubbing, cyanosis or edema  Neurological: Alert and oriented x3  Skin: Warm and dry. No obvious rash    LABS:                        9.9    12.65 )-----------( 315      ( 16 Sep 2021 17:46 )             32.9     09-16    143  |  113<H>  |  28<H>  ----------------------------<  130<H>  4.9   |  23  |  2.12<H>    Ca    9.1      16 Sep 2021 08:51  Phos  3.1     09-16  Mg     2.0     09-16    TPro  6.2  /  Alb  3.0<L>  /  TBili  0.2  /  DBili  x   /  AST  18  /  ALT  11  /  AlkPhos  63  09-16    PT/INR - ( 15 Sep 2021 07:33 )   PT: 10.7 sec;   INR: 0.89          PTT - ( 15 Sep 2021 07:33 )  PTT:30.7 sec                  RADIOLOGY & ADDITIONAL STUDIES:  Reviewed
HPI:  75 F PMHx DM, HTN, CKD Stage III, HLD, Osteoporosis presenting to [facility] as per outpatient Gastroenterologist, Dr Julio Cesar Cox for abnormal EGD/Cscope performed today, 9/14. Patient's history dates back to approximately 3 months ago when she was first told she was iron deficient and noted to have a drop in her Hgb. Patient does not recall what her Hgb level had dropped down to at the time but recalls receiving at least 2-3 iron infusions. Patient at the time denies any dark stools. Patient notes that leading up to that point, she had at least 3 colonoscopies before, the last of which was performed February 2020 which was notable for a 1 cm ascending colon polyp that was removed but not retrieved as well as a 2 mm ascending colon tubular adenoma. Recalls that she was advised to have a repeat C-scope in 3 years time. Approximately 1 month ago, she reports having dark/black stools, reported to be tarry in consistency. Denies any NSAID use, only reports taking ASA 81 mg daily. Around the same time, also reports having decreased PO intake and losing approximately 10 lbs, unintentional. Denies any associated abdominal pain, nausea/vomiting, early satiety.     Family history negative for CRC, gastric cancer, pancreatic cancer. Notable for prostate cancer (son) and breast cancer (daughter).   Former smoker (1 PPD x at least 10 years, quit 20 years ago)    GI consulted for abnormal outpatient EGD/Cscope in the setting of melena.     Endoscopic History  -February 2020 which was notable for a 1 cm ascending colon polyp that was removed but not retrieved as well as a 2 mm ascending colon tubular adenoma. Recalls that she was advised to have a repeat C-scope in 3 years time.  -EGD 9/14/2021: polyposis noted in cardia, fundus, in the greater curvature noted to have a semi-sessile/semi-flat, ulcerated mass of 3.0 cm with scant spontaneous hemorrhage along the greater curvature of gastric body. An attempt was made to raise the mass with submucosal saline injection, unsuccessful. Multiple biopsies obtained. Polyposis noted in gastric body, with approx 40 hemorrhagic appearing polyps ranging in size from 3 mm to 1.5 cm noted. Pedunculated 1.2 cm polyp removed by hot snare in antrum.   -C-scope 9/14/2021: Colorectal polyposis, mainly noted in transverse colon. Significant melena noted. Ascending colon lipoma noted.     Allergies    sulfa drugs (Unknown)    Intolerances      MEDICATIONS:  MEDICATIONS  (STANDING):    MEDICATIONS  (PRN):    PAST MEDICAL & SURGICAL HISTORY:    FAMILY HISTORY:    SOCIAL HISTORY:  [place]: [ ] Current, [ ] Former, [ ] Never; Pack Years:  Alcohol:  Illicit Drugs:    REVIEW OF SYSTEMS:  Constitutional: No fever, chills, weight loss, or fatigue  ENMT:  No visual changes; No difficulty hearing, tinnitus, vertigo; No sinus or throat pain  Neck: No pain or stiffness  Respiratory: No cough, wheezing, or hemoptysis; No shortness of breath  Cardiovascular: No chest pain, palpitations, dizziness, orthopnea, PND, or leg swelling  Gastrointestinal: No abdominal or epigastric pain. No  nausea, vomiting, or hematemesis. No diarrhea, constipation, or steatorrhea. No melena or hematochezia  Genitourinary: No dysuria, urinary frequency/hesitancy, or hematuria  Skin: No itching, burning, rashes or lesions   Musculoskeletal: No joint pain or swelling; No muscle, back or extremity pain    Vital Signs Last 24 Hrs  T(C): 36.9 (14 Sep 2021 16:11), Max: 36.9 (14 Sep 2021 13:48)  T(F): 98.4 (14 Sep 2021 16:11), Max: 98.4 (14 Sep 2021 13:48)  HR: 82 (14 Sep 2021 16:11) (82 - 82)  BP: 184/77 (14 Sep 2021 16:11) (167/79 - 184/77)  BP(mean): --  RR: 18 (14 Sep 2021 16:11) (16 - 18)  SpO2: 100% (14 Sep 2021 16:11) (100% - 100%)      PHYSICAL EXAM:    General: Obese  female, lying comfortably in bed; in no acute distress; daughters present at bedside  HEENT: MMM, conjunctiva and sclera clear  Gastrointestinal: Soft, non-tender non-distended; Normal bowel sounds; No rebound or guarding  Extremities: Normal range of motion, No clubbing, cyanosis or edema  Neurological: Alert and oriented x3  Skin: Warm and dry. No obvious rash    LABS:                        9.8    9.56  )-----------( 325      ( 14 Sep 2021 14:41 )             32.3     09-14    141  |  108  |  32<H>  ----------------------------<  85  4.7   |  23  |  2.16<H>    Ca    9.0      14 Sep 2021 14:40    TPro  6.6  /  Alb  3.5  /  TBili  0.2  /  DBili  x   /  AST  24  /  ALT  16  /  AlkPhos  57  09-14        RADIOLOGY & ADDITIONAL STUDIES:   
CC: Patient is a 75y old  Female who presents with a chief complaint of melena in colon during colonoscopic evaluation outpatient (14 Sep 2021 18:56)      INTERVAL EVENTS: Nauseous and abdominal pain this morning. Small volume emesis this AM, yellow in color no blood seen.    SUBJECTIVE / INTERVAL HPI: Patient seen and examined at bedside. Pt endorses n/v and abdominal pain this morning. Otherwise she feels well and is hungry as she has been NPO for 2 days no.    ROS: negative unless otherwise stated above.    VITAL SIGNS:  Vital Signs Last 24 Hrs  T(C): 36.5 (15 Sep 2021 09:18), Max: 37 (14 Sep 2021 20:55)  T(F): 97.7 (15 Sep 2021 09:18), Max: 98.6 (14 Sep 2021 20:55)  HR: 78 (15 Sep 2021 09:18) (78 - 94)  BP: 184/74 (15 Sep 2021 09:18) (149/70 - 188/75)  BP(mean): --  RR: 18 (15 Sep 2021 09:18) (16 - 18)  SpO2: 99% (15 Sep 2021 09:18) (97% - 100%)        PHYSICAL EXAM:    General: NAD  HEENT: MMM  Neck: supple  Cardiovascular: +S1/S2; RRR  Respiratory: CTA B/L; no W/R/R  Gastrointestinal: soft, mildly distended with large pannus, nontender  Extremities: WWP; no edema, clubbing or cyanosis  Vascular: 2+ radial, DP/PT pulses B/L  Neurological: AAOx3; no focal deficits    MEDICATIONS:  MEDICATIONS  (STANDING):  dextrose 40% Gel 15 Gram(s) Oral once  dextrose 5%. 1000 milliLiter(s) (50 mL/Hr) IV Continuous <Continuous>  dextrose 5%. 1000 milliLiter(s) (100 mL/Hr) IV Continuous <Continuous>  dextrose 50% Injectable 25 Gram(s) IV Push once  dextrose 50% Injectable 12.5 Gram(s) IV Push once  dextrose 50% Injectable 25 Gram(s) IV Push once  glucagon  Injectable 1 milliGRAM(s) IntraMuscular once  influenza  Vaccine (HIGH DOSE) 0.7 milliLiter(s) IntraMuscular once  insulin glargine Injectable (LANTUS) 28 Unit(s) SubCutaneous at bedtime  insulin lispro (ADMELOG) corrective regimen sliding scale   SubCutaneous Before meals and at bedtime  pantoprazole  Injectable 40 milliGRAM(s) IV Push every 12 hours    MEDICATIONS  (PRN):      ALLERGIES:  Allergies    sulfa drugs (Unknown)    Intolerances        LABS:                        8.4    7.74  )-----------( 274      ( 15 Sep 2021 07:33 )             28.2     09-15    147<H>  |  115<H>  |  31<H>  ----------------------------<  90  5.0   |  24  |  2.02<H>    Ca    8.7      15 Sep 2021 07:33  Phos  4.1     09-15  Mg     2.1     09-15    TPro  6.6  /  Alb  3.5  /  TBili  0.2  /  DBili  x   /  AST  24  /  ALT  16  /  AlkPhos  57  09-14    PT/INR - ( 15 Sep 2021 07:33 )   PT: 10.7 sec;   INR: 0.89          PTT - ( 15 Sep 2021 07:33 )  PTT:30.7 sec    CAPILLARY BLOOD GLUCOSE      POCT Blood Glucose.: 118 mg/dL (15 Sep 2021 09:31)      RADIOLOGY & ADDITIONAL TESTS: Reviewed.

## 2021-09-16 NOTE — DISCHARGE NOTE PROVIDER - NSDCMRMEDTOKEN_GEN_ALL_CORE_FT
atorvastatin 20 mg oral tablet: 1 tab(s) orally once a day  Auryxia 210 mg oral tablet:   furosemide 40 mg oral tablet: orally every other day  HydrALAZINE Plus 25 mg-25 mg oral capsule: orally 2 times a day  losartan 50 mg oral tablet: 1 tab(s) orally once a day  metoprolol succinate 50 mg oral tablet, extended release: 1 tab(s) orally once a day  NIFEdipine (Eqv-Procardia XL) 60 mg oral tablet, extended release: 1 tab(s) orally once a day  NovoLOG 100 units/mL subcutaneous solution:   pantoprazole 40 mg oral delayed release tablet: 1 tab(s) orally once a day   Tresiba 100 units/mL subcutaneous solution:   Vitamin C:   Vitamin D3 25 mcg (1000 intl units) oral tablet: 1 tab(s) orally once a day

## 2021-09-16 NOTE — DISCHARGE NOTE PROVIDER - NSDCFUADDAPPT_GEN_ALL_CORE_FT
Dr. Cox's office will call you for a followup appointment. If you don't hear for them please make an appointment within 2 weeks    I scheduled an appointment with Dr. Kirk Potter Mon 9/20 9AM.

## 2021-09-16 NOTE — PROGRESS NOTE ADULT - ASSESSMENT
75 F PMHx DM, HTN, CKD Stage III, HLD, Osteoporosis presenting to [facility] as per outpatient Gastroenterologist, Dr Julio Cesar Cox for abnormal EGD/Cscope performed today, 9/14, findings concerning for malignancy in the setting of reported history of JOVANI, GI consulted for abnormal EGD/C-scope.    #Melena  #Anemia  Patient presenting with 1 month history of anemia, reported to JOVANI in outpatient setting in the setting of melena and recent unintentional weight loss. EGD/Cscope evaluation notable for polyposis in stomach and colon, highly suspicious for malignant process.   -February 2020 which was notable for a 1 cm ascending colon polyp that was removed but not retrieved as well as a 2 mm ascending colon tubular adenoma. Recalls that she was advised to have a repeat C-scope in 3 years time.  -EGD 9/14/2021: polyposis noted in cardia, fundus, in the greater curvature noted to have a semi-sessile/semi-flat, ulcerated mass of 3.0 cm with scant spontaneous hemorrhage along the greater curvature of gastric body. An attempt was made to raise the mass with submucosal saline injection, unsuccessful. Multiple biopsies obtained. Polyposis noted in gastric body, with approx 40 hemorrhagic appearing polyps ranging in size from 3 mm to 1.5 cm noted. Pedunculated 1.2 cm polyp removed by hot snare in antrum.   -C-scope 9/14/2021: Colorectal polyposis, mainly noted in transverse colon. Significant melena noted. Ascending colon lipoma noted.   -f/u pathology from outpatient GI provider, Dr Julio Cesar Cox ([facility])   -Fe studies c/w ACD  -Monitor H/H  -Transfusion goal as per primary team   -Maintain active T&S  -Protonix 40 mg IVP BID   -EGD 9/15 - Normal esophagus.  Many mixed non-bleeding polyps of multilobular appearance ranging in size from 3 mm to 15 mm were found in the whole stomach. Site of recent polypectomy visualized, no stigmata of recent bleeding noted. Gastroscope not advanced to duodenum given complication of hypoxia. On outpatient EGD dated 9/14/2021, no lesions or active bleeding noted in duodenum.  -Discussed EGD findings from 9/15 procedure. Pathology results not finalized from outpatient EGD/C-scope as per Dr Cox. Given no further episodes of overt bleeding and H/H at baseline, can consider pursuit of workup for possible small bowel bleeding with outpatient GI if noted to have further bleeding after discharge.    Case discussed with c attending and primary team.     Thank you for allowing us to participate in the care of this patient.  GI will sign off. Please call back with any questions or concerns.     Nisreen Lopez DO  Gastroenterology Fellow  Pager: [phone]

## 2021-09-16 NOTE — DISCHARGE NOTE PROVIDER - NSDCCPCAREPLAN_GEN_ALL_CORE_FT
PRINCIPAL DISCHARGE DIAGNOSIS  Diagnosis: Acute GI bleeding  Assessment and Plan of Treatment: You came in for melena which is a dark black tarry stool likely due to a GI bleed as seen on your endoscopy and colonoscopy. You have not had any further melena while you were here in the hospital. Your blood count has been stable. You should take the pantoprazole 40mg daily that we prescribed for you at home to reduce the risk of bleeding. You should hold off on taking your aspirin until you followup with Dr. Cox and Dr. Potter your PCP. You should followup with Dr. Cox to get the final results of your endoscopies and colonoscopy.      SECONDARY DISCHARGE DIAGNOSES  Diagnosis: Hypertension  Assessment and Plan of Treatment: You had high blood pressure while you were in the hospital. This was likely due to not taking your BP meds for 2 days. We slowly restarted your nifedipine 60mg daily, and hydralazine 25mg two times a day. Your BP improved while in the hospital. You can restart your losartan 50 and metoprolol at home as well. Please followup with your primary care doctor regarding your blood pressure medications.    Diagnosis: Diabetes mellitus  Assessment and Plan of Treatment: You are on insulin for your diabetes. Your a1c is 5.9 which means  that your diabetes is well controlled. Continue your diabetes insulin regimen at home as you were prior to coming to the hospital.

## 2021-09-16 NOTE — DISCHARGE NOTE PROVIDER - HOSPITAL COURSE
#Discharge: do not delete    Patient is 74 yo F with past medical history of IDDM, HTN, CKD Stage III, HLD, Osteoporosis   Presented with melena and abnormal EGD/Cscope performed on day of presentation by Dr. Julio Cesar Cox, found to have Upper GI bleed 2/2 gastric ulcers/polyps  Problem List/Main Diagnoses (system-based):   1. GI bleed - pt came in w/ 1 month of melena. Found to have colorectal polyposis and gastric polyposis w/ gastric ulcer. Pt needs to followup path results with Dr. Cox within 1-2weeks  2. HTN - BP elevated because pt was off of her BP meds for 2 days. Restarted nifedipine 60mg qd dand hydralazine 25mg BID. At discharge pt can restart her metoprolol and losartan when she returns home.  3. CKD - can restart her home auryxia.   4. Diabetes - pt was hypoglycemic during admission due to being NPO and on her insulin. Hypoglycemia improved when diet resumed. Can restart home tresiba 56U and novolog 8-10U pre meal. A1c 5.9 well controlled.  5. Anemia - pt likely had a slow GI bleed given the polyps and scope findings. Pt has stable hgb now unchanged and did not require transfusions. Will need followup outpatient with GI.    Inpatient treatment course: as above  New medications: pantoprazole 40mg qd  Labs to be followed outpatient: cbc, pathology results  Exam to be followed outpatient: colonoscopy

## 2021-09-16 NOTE — DISCHARGE NOTE NURSING/CASE MANAGEMENT/SOCIAL WORK - NSDCPEFALRISK_GEN_ALL_CORE
For information on Fall & injury Prevention, visit [email or web address]

## 2021-09-16 NOTE — DISCHARGE NOTE NURSING/CASE MANAGEMENT/SOCIAL WORK - PATIENT PORTAL LINK FT
You can access the FollowMyHealth Patient Portal offered by [facility] by registering at the following website: [email or web address]. By joining [employer]’s FollowMyHealth portal, you will also be able to view your health information using other applications (apps) compatible with our system.

## 2021-09-16 NOTE — PROGRESS NOTE ADULT - REASON FOR ADMISSION
melena in colon during colonoscopic evaluation outpatient
melena in colon during colonoscopic evaluation outpatient

## 2021-09-22 ENCOUNTER — EMERGENCY (EMERGENCY)
Facility: HOSPITAL | Age: 76
LOS: 1 days | Discharge: HOME | End: 2021-09-22
Attending: EMERGENCY MEDICINE | Admitting: EMERGENCY MEDICINE
Payer: MEDICARE

## 2021-09-22 VITALS
SYSTOLIC BLOOD PRESSURE: 179 MMHG | DIASTOLIC BLOOD PRESSURE: 72 MMHG | WEIGHT: 182.98 LBS | RESPIRATION RATE: 18 BRPM | HEIGHT: 64 IN | OXYGEN SATURATION: 100 % | TEMPERATURE: 99 F | HEART RATE: 104 BPM

## 2021-09-22 DIAGNOSIS — Z88.2 ALLERGY STATUS TO SULFONAMIDES: ICD-10-CM

## 2021-09-22 DIAGNOSIS — R19.00 INTRA-ABDOMINAL AND PELVIC SWELLING, MASS AND LUMP, UNSPECIFIED SITE: ICD-10-CM

## 2021-09-22 DIAGNOSIS — Z79.84 LONG TERM (CURRENT) USE OF ORAL HYPOGLYCEMIC DRUGS: ICD-10-CM

## 2021-09-22 DIAGNOSIS — E11.22 TYPE 2 DIABETES MELLITUS WITH DIABETIC CHRONIC KIDNEY DISEASE: ICD-10-CM

## 2021-09-22 DIAGNOSIS — K92.1 MELENA: ICD-10-CM

## 2021-09-22 DIAGNOSIS — D64.9 ANEMIA, UNSPECIFIED: ICD-10-CM

## 2021-09-22 DIAGNOSIS — Z90.49 ACQUIRED ABSENCE OF OTHER SPECIFIED PARTS OF DIGESTIVE TRACT: Chronic | ICD-10-CM

## 2021-09-22 DIAGNOSIS — Z98.51 TUBAL LIGATION STATUS: Chronic | ICD-10-CM

## 2021-09-22 DIAGNOSIS — Z98.51 TUBAL LIGATION STATUS: ICD-10-CM

## 2021-09-22 DIAGNOSIS — Z79.4 LONG TERM (CURRENT) USE OF INSULIN: ICD-10-CM

## 2021-09-22 DIAGNOSIS — Z90.49 ACQUIRED ABSENCE OF OTHER SPECIFIED PARTS OF DIGESTIVE TRACT: ICD-10-CM

## 2021-09-22 DIAGNOSIS — Z79.899 OTHER LONG TERM (CURRENT) DRUG THERAPY: ICD-10-CM

## 2021-09-22 LAB
ALBUMIN SERPL ELPH-MCNC: 3.6 G/DL — SIGNIFICANT CHANGE UP (ref 3.3–5)
ALP SERPL-CCNC: 86 U/L — SIGNIFICANT CHANGE UP (ref 40–120)
ALT FLD-CCNC: 12 U/L — SIGNIFICANT CHANGE UP (ref 10–45)
ANION GAP SERPL CALC-SCNC: 8 MMOL/L — SIGNIFICANT CHANGE UP (ref 5–17)
APTT BLD: 33.8 SEC — SIGNIFICANT CHANGE UP (ref 27.5–35.5)
AST SERPL-CCNC: 20 U/L — SIGNIFICANT CHANGE UP (ref 10–40)
BASOPHILS # BLD AUTO: 0.03 K/UL — SIGNIFICANT CHANGE UP (ref 0–0.2)
BASOPHILS NFR BLD AUTO: 0.3 % — SIGNIFICANT CHANGE UP (ref 0–2)
BILIRUB SERPL-MCNC: <0.2 MG/DL — SIGNIFICANT CHANGE UP (ref 0.2–1.2)
BLD GP AB SCN SERPL QL: NEGATIVE — SIGNIFICANT CHANGE UP
BUN SERPL-MCNC: 31 MG/DL — HIGH (ref 7–23)
CALCIUM SERPL-MCNC: 9.1 MG/DL — SIGNIFICANT CHANGE UP (ref 8.4–10.5)
CHLORIDE SERPL-SCNC: 108 MMOL/L — SIGNIFICANT CHANGE UP (ref 96–108)
CO2 SERPL-SCNC: 27 MMOL/L — SIGNIFICANT CHANGE UP (ref 22–31)
CREAT SERPL-MCNC: 2.75 MG/DL — HIGH (ref 0.5–1.3)
EOSINOPHIL # BLD AUTO: 0.25 K/UL — SIGNIFICANT CHANGE UP (ref 0–0.5)
EOSINOPHIL NFR BLD AUTO: 2.5 % — SIGNIFICANT CHANGE UP (ref 0–6)
GLUCOSE SERPL-MCNC: 143 MG/DL — HIGH (ref 70–99)
HCT VFR BLD CALC: 31 % — LOW (ref 34.5–45)
HGB BLD-MCNC: 9.5 G/DL — LOW (ref 11.5–15.5)
IMM GRANULOCYTES NFR BLD AUTO: 0.4 % — SIGNIFICANT CHANGE UP (ref 0–1.5)
INR BLD: 0.89 — SIGNIFICANT CHANGE UP (ref 0.88–1.16)
LYMPHOCYTES # BLD AUTO: 2.7 K/UL — SIGNIFICANT CHANGE UP (ref 1–3.3)
LYMPHOCYTES # BLD AUTO: 27.3 % — SIGNIFICANT CHANGE UP (ref 13–44)
MCHC RBC-ENTMCNC: 26.7 PG — LOW (ref 27–34)
MCHC RBC-ENTMCNC: 30.6 GM/DL — LOW (ref 32–36)
MCV RBC AUTO: 87.1 FL — SIGNIFICANT CHANGE UP (ref 80–100)
MONOCYTES # BLD AUTO: 0.68 K/UL — SIGNIFICANT CHANGE UP (ref 0–0.9)
MONOCYTES NFR BLD AUTO: 6.9 % — SIGNIFICANT CHANGE UP (ref 2–14)
NEUTROPHILS # BLD AUTO: 6.19 K/UL — SIGNIFICANT CHANGE UP (ref 1.8–7.4)
NEUTROPHILS NFR BLD AUTO: 62.6 % — SIGNIFICANT CHANGE UP (ref 43–77)
NRBC # BLD: 0 /100 WBCS — SIGNIFICANT CHANGE UP (ref 0–0)
PLATELET # BLD AUTO: 370 K/UL — SIGNIFICANT CHANGE UP (ref 150–400)
POTASSIUM SERPL-MCNC: 5.6 MMOL/L — HIGH (ref 3.5–5.3)
POTASSIUM SERPL-SCNC: 5.6 MMOL/L — HIGH (ref 3.5–5.3)
PROT SERPL-MCNC: 6.9 G/DL — SIGNIFICANT CHANGE UP (ref 6–8.3)
PROTHROM AB SERPL-ACNC: 10.7 SEC — SIGNIFICANT CHANGE UP (ref 10.6–13.6)
RBC # BLD: 3.56 M/UL — LOW (ref 3.8–5.2)
RBC # FLD: 17.2 % — HIGH (ref 10.3–14.5)
RH IG SCN BLD-IMP: POSITIVE — SIGNIFICANT CHANGE UP
SODIUM SERPL-SCNC: 143 MMOL/L — SIGNIFICANT CHANGE UP (ref 135–145)
WBC # BLD: 9.89 K/UL — SIGNIFICANT CHANGE UP (ref 3.8–10.5)
WBC # FLD AUTO: 9.89 K/UL — SIGNIFICANT CHANGE UP (ref 3.8–10.5)

## 2021-09-22 PROCEDURE — 86901 BLOOD TYPING SEROLOGIC RH(D): CPT

## 2021-09-22 PROCEDURE — 86850 RBC ANTIBODY SCREEN: CPT

## 2021-09-22 PROCEDURE — 86900 BLOOD TYPING SEROLOGIC ABO: CPT

## 2021-09-22 PROCEDURE — 85730 THROMBOPLASTIN TIME PARTIAL: CPT

## 2021-09-22 PROCEDURE — 99284 EMERGENCY DEPT VISIT MOD MDM: CPT

## 2021-09-22 PROCEDURE — 80053 COMPREHEN METABOLIC PANEL: CPT

## 2021-09-22 PROCEDURE — 36415 COLL VENOUS BLD VENIPUNCTURE: CPT

## 2021-09-22 PROCEDURE — 85025 COMPLETE CBC W/AUTO DIFF WBC: CPT

## 2021-09-22 PROCEDURE — 85610 PROTHROMBIN TIME: CPT

## 2021-09-22 NOTE — ED PROVIDER NOTE - PATIENT PORTAL LINK FT
You can access the FollowMyHealth Patient Portal offered by [facility] by registering at the following website: [email or web address]. By joining [employer]’s FollowMyHealth portal, you will also be able to view your health information using other applications (apps) compatible with our system.

## 2021-09-22 NOTE — ED PROVIDER NOTE - OBJECTIVE STATEMENT
Bill As A Line Item Or As Units: Line Item 76 y/o F pt with PMHx of CKD, DM, anemia, and GI bleed s/p endoscopy and colonoscopy presents to ED c/o abnormal colonoscopy. Pt was called by Dr. Cox's office and instructed to come to ED for admission. Pt was found to have a polyp on her colonoscopy and was discharged home 1 week ago, but since then, she's had some persistent black stool. Pt denies vomiting, abdominal pain, fever, or any other acute complaints. 74 y/o F pt with PMHx of CKD, DM, anemia, and GI bleed s/p endoscopy and colonoscopy presents to ED c/o abnormal colonoscopy. Pt states she was called by Ashwini from Dr. Cox's office and instructed to come to ED for admission. Pt was found to have a polyp on her colonoscopy, biopsy done and was discharged home 1 week ago, and has 2 episodes of black stool since. Pt was seen by hematology yesterday and hgb was 9.9.  Pt denies vomiting, abdominal pain, fever, or any other acute complaints.

## 2021-09-22 NOTE — ED ADULT NURSE NOTE - NSIMPLEMENTINTERV_GEN_ALL_ED
Implemented All Fall with Harm Risk Interventions:  [place] to call system. Call bell, personal items and telephone within reach. Instruct patient to call for assistance. Room bathroom lighting operational. Non-slip footwear when patient is off stretcher. Physically safe environment: no spills, clutter or unnecessary equipment. Stretcher in lowest position, wheels locked, appropriate side rails in place. Provide visual cue, wrist band, yellow gown, etc. Monitor gait and stability. Monitor for mental status changes and reorient to person, place, and time. Review medications for side effects contributing to fall risk. Reinforce activity limits and safety measures with patient and family. Provide visual clues: red socks.

## 2021-09-22 NOTE — ED PROVIDER NOTE - NSFOLLOWUPINSTRUCTIONS_ED_ALL_ED_FT
General Mass Excision    WHAT YOU NEED TO KNOW:    What do I need to know about a general mass excision? Excision is surgery to remove a mass, such as a tumor. Excision may be done for a diagnosis or for treatment. Removal may be the only treatment needed, or may be part of your treatment plan.    How do I prepare for surgery?   •Your surgeon will tell you how to prepare. He or she may tell you not to eat or drink anything after midnight on the day before surgery. Arrange to have someone drive you home after you are discharged.      •Tell your surgeon about all medicines you currently take. He or she will tell you if you need to stop any medicine for surgery, and when to stop. He or she will tell you which medicines to take or not take on the day of surgery.      •Tell your surgeon about all your allergies, including to anesthesia or antibiotics.      •You may need an ultrasound, CT, or MRI. These or other tests can help your surgeon learn more about the mass. This will help him or her plan your surgery more easily.      What will happen during surgery?   •Depending on the kind of surgery you have, you may need general, local, or regional anesthesia. General anesthesia will keep you asleep and free from pain during surgery. Local anesthesia numbs the surgery area. Regional anesthesia numbs an area of your body. With local or regional anesthesia, you may feel some pressure, but you should not feel pain.      •Your surgeon may make an incision in the skin over the mass. He or she may instead make several small incisions. Surgery is done with tools put into the small incisions. Your surgeon will remove the mass with a knife, laser, or other tool. He or she may need to remove some tissue around the mass. This is done to make sure all of the mass is removed. Some or all of the mass may be sent to a lab to be tested for cancer or other problems.      •The surgery area may be closed with stitches, staples, or medical tape. This depends on where the mass was and how large an area was removed. Your surgeon may also need to repair tissue near the mass after it is removed. This may help improve the appearance or function of the surgery area. The area may be covered with a bandage to keep it clean and to prevent an infection.      What should I expect after surgery?   •You may have some bleeding or bruising in the surgery area. This is expected and should get better in a few days.      •You may be given medicine to prevent or treat pain or an infection.      •You may need to meet with a healthcare provider to get diagnosis results. Your provider may want to make a treatment plan based on the results.      What are the risks of a general mass excision? You may bleed more than expected or develop an infection. Nerves or tissues may be damaged. It may not be possible to remove all of the mass. If the mass is cancer, some of the cells left in your body may grow or spread. The mass may come back after surgery. You may develop a life-threatening blood clot. You may have permanent scars in the surgery area.    CARE AGREEMENT:    You have the right to help plan your care. Learn about your health condition and how it may be treated. Discuss treatment options with your healthcare providers to decide what care you want to receive. You always have the right to refuse treatment.        © Copyright [employer] 2021

## 2021-09-22 NOTE — ED ADULT NURSE NOTE - OBJECTIVE STATEMENT
Received ambulatory with steady gait. Patient reports admitted Tues and [place] Thurs for melena x 1 month, patient SP colonoscopy- patient was then instructed to come back today for "abdominal nodule" work up. Last BM tonight, patient describes it to be dark red blood.     AOX4, speaking full sentences, family @  bedside.  +PERRLA.  Patient denies chest pain, shortness of breath, difficulty breathing and any form of distress not noted.  Resps even and nonlabored. Moves all extremities. No obvious trauma/injury/deformity noted. Patient oriented to ED area. All needs attended. POC reviewed. Fall risk precautions maintained. Purposeful proactive hourly rounding in progress.

## 2021-09-22 NOTE — ED ADULT NURSE NOTE - PAIN RATING/NUMBER SCALE (0-10): REST
0 I have personally performed a face to face diagnostic evaluation on this patient. I have reviewed the ACP note and agree with the history, exam and plan of care, except as noted.

## 2021-09-22 NOTE — ED PROVIDER NOTE - CLINICAL SUMMARY MEDICAL DECISION MAKING FREE TEXT BOX
74 y/o F pt presents to ED with melena and abnormal colonoscopy findings. Plan to check labs, GI paged, and will admit. 76 y/o F pt presents to ED with melena and abnormal colonoscopy findings. Plan to check labs, GI paged.  Hospitalist on call, Dr. Hutson contacted and case discussed. Pt was referred as outpatient on previous admission for GI follow up.   Case discussed with GI on call, Dr. Hernandez. There is no known information of pt getting a procedure tomorrow. She will contact patient and Dr. Cox in the morning to clarify this situation.  On re-evaluation, pt is AAOx3 and in NAD. Vitals wnl. pt well appearing in ED, no abdominal pain, no vomiting. Return precautions given.

## 2021-09-22 NOTE — ED ADULT TRIAGE NOTE - CHIEF COMPLAINT QUOTE
s/p colonoscopy for melena  and endoscopy was done here Tuesday last week for GI bleeding sent here for further evaluation

## 2021-09-23 ENCOUNTER — NON-APPOINTMENT (OUTPATIENT)
Age: 76
End: 2021-09-23

## 2021-09-23 VITALS
TEMPERATURE: 98 F | SYSTOLIC BLOOD PRESSURE: 166 MMHG | OXYGEN SATURATION: 99 % | HEART RATE: 86 BPM | DIASTOLIC BLOOD PRESSURE: 66 MMHG | RESPIRATION RATE: 18 BRPM

## 2021-09-23 DIAGNOSIS — Z01.812 ENCOUNTER FOR PREPROCEDURAL LABORATORY EXAMINATION: ICD-10-CM

## 2021-09-23 PROBLEM — N18.9 CHRONIC KIDNEY DISEASE, UNSPECIFIED: Chronic | Status: ACTIVE | Noted: 2021-09-14

## 2021-09-23 PROBLEM — D64.9 ANEMIA, UNSPECIFIED: Chronic | Status: ACTIVE | Noted: 2021-09-14

## 2021-09-23 PROBLEM — E11.9 TYPE 2 DIABETES MELLITUS WITHOUT COMPLICATIONS: Chronic | Status: ACTIVE | Noted: 2021-09-14

## 2021-09-24 DIAGNOSIS — D17.79 BENIGN LIPOMATOUS NEOPLASM OF OTHER SITES: ICD-10-CM

## 2021-09-24 DIAGNOSIS — K31.7 POLYP OF STOMACH AND DUODENUM: ICD-10-CM

## 2021-09-24 DIAGNOSIS — K92.2 GASTROINTESTINAL HEMORRHAGE, UNSPECIFIED: ICD-10-CM

## 2021-09-24 DIAGNOSIS — Z87.891 PERSONAL HISTORY OF NICOTINE DEPENDENCE: ICD-10-CM

## 2021-09-24 DIAGNOSIS — Z88.2 ALLERGY STATUS TO SULFONAMIDES: ICD-10-CM

## 2021-09-24 DIAGNOSIS — E78.5 HYPERLIPIDEMIA, UNSPECIFIED: ICD-10-CM

## 2021-09-24 DIAGNOSIS — R09.02 HYPOXEMIA: ICD-10-CM

## 2021-09-24 DIAGNOSIS — K63.5 POLYP OF COLON: ICD-10-CM

## 2021-09-24 DIAGNOSIS — M81.0 AGE-RELATED OSTEOPOROSIS WITHOUT CURRENT PATHOLOGICAL FRACTURE: ICD-10-CM

## 2021-09-24 DIAGNOSIS — K25.4 CHRONIC OR UNSPECIFIED GASTRIC ULCER WITH HEMORRHAGE: ICD-10-CM

## 2021-09-24 DIAGNOSIS — N18.30 CHRONIC KIDNEY DISEASE, STAGE 3 UNSPECIFIED: ICD-10-CM

## 2021-09-24 DIAGNOSIS — E11.649 TYPE 2 DIABETES MELLITUS WITH HYPOGLYCEMIA WITHOUT COMA: ICD-10-CM

## 2021-09-24 DIAGNOSIS — E66.9 OBESITY, UNSPECIFIED: ICD-10-CM

## 2021-09-24 DIAGNOSIS — E11.22 TYPE 2 DIABETES MELLITUS WITH DIABETIC CHRONIC KIDNEY DISEASE: ICD-10-CM

## 2021-09-24 DIAGNOSIS — I12.9 HYPERTENSIVE CHRONIC KIDNEY DISEASE WITH STAGE 1 THROUGH STAGE 4 CHRONIC KIDNEY DISEASE, OR UNSPECIFIED CHRONIC KIDNEY DISEASE: ICD-10-CM

## 2021-09-24 DIAGNOSIS — D50.9 IRON DEFICIENCY ANEMIA, UNSPECIFIED: ICD-10-CM

## 2021-09-26 ENCOUNTER — APPOINTMENT (OUTPATIENT)
Dept: DISASTER EMERGENCY | Facility: CLINIC | Age: 76
End: 2021-09-26

## 2021-09-26 DIAGNOSIS — Z01.818 ENCOUNTER FOR OTHER PREPROCEDURAL EXAMINATION: ICD-10-CM

## 2021-09-28 LAB — SARS-COV-2 N GENE NPH QL NAA+PROBE: NOT DETECTED

## 2021-09-29 ENCOUNTER — OUTPATIENT (OUTPATIENT)
Dept: OUTPATIENT SERVICES | Facility: HOSPITAL | Age: 76
LOS: 1 days | End: 2021-09-29
Payer: MEDICARE

## 2021-09-29 ENCOUNTER — RESULT REVIEW (OUTPATIENT)
Age: 76
End: 2021-09-29

## 2021-09-29 ENCOUNTER — APPOINTMENT (OUTPATIENT)
Dept: GASTROENTEROLOGY | Facility: HOSPITAL | Age: 76
End: 2021-09-29

## 2021-09-29 VITALS
OXYGEN SATURATION: 100 % | SYSTOLIC BLOOD PRESSURE: 145 MMHG | HEART RATE: 80 BPM | DIASTOLIC BLOOD PRESSURE: 65 MMHG | RESPIRATION RATE: 16 BRPM

## 2021-09-29 VITALS
SYSTOLIC BLOOD PRESSURE: 184 MMHG | TEMPERATURE: 98 F | HEART RATE: 80 BPM | RESPIRATION RATE: 15 BRPM | DIASTOLIC BLOOD PRESSURE: 77 MMHG | OXYGEN SATURATION: 96 %

## 2021-09-29 DIAGNOSIS — Z98.51 TUBAL LIGATION STATUS: Chronic | ICD-10-CM

## 2021-09-29 DIAGNOSIS — Z90.49 ACQUIRED ABSENCE OF OTHER SPECIFIED PARTS OF DIGESTIVE TRACT: Chronic | ICD-10-CM

## 2021-09-29 LAB
BASE EXCESS BLDV CALC-SCNC: -1.9 MMOL/L — SIGNIFICANT CHANGE UP (ref -2–3)
CA-I SERPL-SCNC: 1.27 MMOL/L — SIGNIFICANT CHANGE UP (ref 1.15–1.33)
CO2 BLDV-SCNC: 25.7 MMOL/L — SIGNIFICANT CHANGE UP (ref 22–26)
GAS PNL BLDV: 143 MMOL/L — SIGNIFICANT CHANGE UP (ref 136–145)
GAS PNL BLDV: SIGNIFICANT CHANGE UP
GLUCOSE BLDC GLUCOMTR-MCNC: 105 MG/DL — HIGH (ref 70–99)
HCO3 BLDV-SCNC: 24 MMOL/L — SIGNIFICANT CHANGE UP (ref 22–29)
PCO2 BLDV: 46 MMHG — HIGH (ref 39–42)
PH BLDV: 7.33 — SIGNIFICANT CHANGE UP (ref 7.32–7.43)
PO2 BLDV: <35 MMHG — LOW (ref 25–45)
POTASSIUM BLDV-SCNC: 5.1 MMOL/L — SIGNIFICANT CHANGE UP (ref 3.5–5.1)
SAO2 % BLDV: 36.3 % — LOW (ref 67–88)

## 2021-09-29 PROCEDURE — 84132 ASSAY OF SERUM POTASSIUM: CPT

## 2021-09-29 PROCEDURE — 82962 GLUCOSE BLOOD TEST: CPT

## 2021-09-29 PROCEDURE — 43239 EGD BIOPSY SINGLE/MULTIPLE: CPT

## 2021-09-29 PROCEDURE — 88342 IMHCHEM/IMCYTCHM 1ST ANTB: CPT | Mod: 26

## 2021-09-29 PROCEDURE — 88305 TISSUE EXAM BY PATHOLOGIST: CPT

## 2021-09-29 PROCEDURE — 82330 ASSAY OF CALCIUM: CPT

## 2021-09-29 PROCEDURE — 88305 TISSUE EXAM BY PATHOLOGIST: CPT | Mod: 26

## 2021-09-29 PROCEDURE — 82803 BLOOD GASES ANY COMBINATION: CPT

## 2021-09-29 PROCEDURE — 84295 ASSAY OF SERUM SODIUM: CPT

## 2021-09-29 PROCEDURE — 88341 IMHCHEM/IMCYTCHM EA ADD ANTB: CPT

## 2021-09-29 RX ORDER — IPRATROPIUM/ALBUTEROL SULFATE 18-103MCG
3 AEROSOL WITH ADAPTER (GRAM) INHALATION ONCE
Refills: 0 | Status: DISCONTINUED | OUTPATIENT
Start: 2021-09-29 | End: 2021-09-29

## 2021-09-29 RX ORDER — ACETAMINOPHEN 500 MG
1000 TABLET ORAL ONCE
Refills: 0 | Status: DISCONTINUED | OUTPATIENT
Start: 2021-09-29 | End: 2021-09-29

## 2021-09-30 LAB — SURGICAL PATHOLOGY STUDY: SIGNIFICANT CHANGE UP

## 2021-10-14 NOTE — PROGRESS NOTE ADULT - ATTENDING COMMENTS
This is a recent snapshot of the patient's [facility] medical record.  For current drug dose and complete information and questions, call [phone] or In Basket pool, [facility] home infusion ([id number])  CSN Number:  [id number]      
75yoF was sent in by outpatient GI Dr. Julio Cesar Cox for melena. EGD 9/14 showed polyposis with scant spontaneous hemorrhage in the gastric body and melena noted in the colon from colonoscopy. Pt has hx of JOVANI and has had 1 month of melena and 10lb unintentional weight loss. Repeat EGD today showed retained gastric clear fluid with food residues. Again noticed multiple nodules/polyps in the lower gastric body. Some larger sized nodules with superficial ulcers. No active bleeding. The procedure was aborted due to desaturation likely 2/2 FAM.     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 Follow up biopsy from Dr. Julio Cesar Cox. Pt is currently stable from GIB perspective. Can follow up as outpatient.
Agree with above.
Pt. seen and examined earlier today; I have read Dr. Vega's note, I agree w/ his findings and plan of care as documented; cont. PPI, monitor CBC, f/u GI recs; monitor BP, resume home antihypertensive regimen; monitor blood glucose, adjust insulin accordingly

## 2021-10-20 RX ORDER — PANTOPRAZOLE 40 MG/1
40 TABLET, DELAYED RELEASE ORAL
Qty: 60 | Refills: 5 | Status: ACTIVE | COMMUNITY
Start: 2021-10-19 | End: 1900-01-01

## 2021-10-20 RX ORDER — SUCRALFATE 1 G/10ML
1 SUSPENSION ORAL
Qty: 420 | Refills: 1 | Status: ACTIVE | COMMUNITY
Start: 2021-10-19 | End: 1900-01-01

## 2021-12-09 ENCOUNTER — APPOINTMENT (OUTPATIENT)
Dept: GASTROENTEROLOGY | Facility: CLINIC | Age: 76
End: 2021-12-09
Payer: MEDICARE

## 2021-12-09 VITALS
HEIGHT: 64 IN | BODY MASS INDEX: 31.24 KG/M2 | OXYGEN SATURATION: 94 % | HEART RATE: 85 BPM | SYSTOLIC BLOOD PRESSURE: 125 MMHG | TEMPERATURE: 97.4 F | WEIGHT: 183 LBS | DIASTOLIC BLOOD PRESSURE: 72 MMHG

## 2021-12-09 DIAGNOSIS — K25.9 GASTRIC ULCER, UNSPECIFIED AS ACUTE OR CHRONIC, W/OUT HEMORRHAGE OR PERFORATION: ICD-10-CM

## 2021-12-09 PROCEDURE — 99214 OFFICE O/P EST MOD 30 MIN: CPT

## 2021-12-09 RX ORDER — PANTOPRAZOLE 40 MG/1
40 TABLET, DELAYED RELEASE ORAL DAILY
Qty: 30 | Refills: 5 | Status: ACTIVE | COMMUNITY
Start: 2021-12-09 | End: 1900-01-01

## 2022-02-17 ENCOUNTER — APPOINTMENT (OUTPATIENT)
Dept: GASTROENTEROLOGY | Facility: CLINIC | Age: 77
End: 2022-02-17

## 2022-03-15 PROBLEM — K25.9 GASTRIC ULCER: Status: ACTIVE | Noted: 2021-10-19

## 2022-03-15 NOTE — ASSESSMENT
[FreeTextEntry1] : Restart PPI\par Check cbc and ferritin, iron profile\par Not clear why she has diffuse inflammatory polyps\par Follow up with primary GI

## 2022-03-15 NOTE — HISTORY OF PRESENT ILLNESS
[FreeTextEntry1] : 76 previously seen in hospital for GI bleed. Has multiple gastric polyps which appear hyperplastic and inflammatory\par Previously had polypectomy which resulted in bleeding and hospitalization\par Pathology has not found any high risk features\par She is not taking NSAIDs\par No FHx of any polyposis syndrome

## 2022-03-15 NOTE — PHYSICAL EXAM
[General Appearance - Alert] : alert [General Appearance - In No Acute Distress] : in no acute distress [General Appearance - Well Nourished] : well nourished [General Appearance - Well Developed] : well developed [Sclera] : the sclera and conjunctiva were normal [Neck Appearance] : the appearance of the neck was normal [Neck Cervical Mass (___cm)] : no neck mass was observed [Jugular Venous Distention Increased] : there was no jugular-venous distention [] : no respiratory distress [Exaggerated Use Of Accessory Muscles For Inspiration] : no accessory muscle use [Heart Sounds] : normal S1 and S2 [Edema] : there was no peripheral edema [Abdomen Tenderness] : non-tender [Cervical Lymph Nodes Enlarged Posterior Bilaterally] : posterior cervical [Cervical Lymph Nodes Enlarged Anterior Bilaterally] : anterior cervical [No CVA Tenderness] : no ~M costovertebral angle tenderness [No Spinal Tenderness] : no spinal tenderness [No Focal Deficits] : no focal deficits [Oriented To Time, Place, And Person] : oriented to person, place, and time [Impaired Insight] : insight and judgment were intact [Affect] : the affect was normal

## 2023-03-01 NOTE — DISCHARGE NOTE PROVIDER - NSDCHC_MEDRECSTATUS_GEN_ALL_CORE
Last visit 12/12/22  Last ordered 01/31/23   Admission Reconciliation is Completed  Discharge Reconciliation is Completed

## 2023-05-18 ENCOUNTER — EMERGENCY (EMERGENCY)
Facility: HOSPITAL | Age: 78
LOS: 0 days | Discharge: HOME | End: 2023-05-18
Attending: EMERGENCY MEDICINE
Payer: MEDICARE

## 2023-05-18 VITALS
HEART RATE: 73 BPM | SYSTOLIC BLOOD PRESSURE: 174 MMHG | DIASTOLIC BLOOD PRESSURE: 66 MMHG | RESPIRATION RATE: 13 BRPM | OXYGEN SATURATION: 94 %

## 2023-05-18 VITALS
SYSTOLIC BLOOD PRESSURE: 181 MMHG | HEIGHT: 64 IN | OXYGEN SATURATION: 98 % | RESPIRATION RATE: 18 BRPM | TEMPERATURE: 99 F | HEART RATE: 89 BPM | DIASTOLIC BLOOD PRESSURE: 70 MMHG | WEIGHT: 166.01 LBS

## 2023-05-18 DIAGNOSIS — Z98.51 TUBAL LIGATION STATUS: Chronic | ICD-10-CM

## 2023-05-18 DIAGNOSIS — Z86.2 PERSONAL HISTORY OF DISEASES OF THE BLOOD AND BLOOD-FORMING ORGANS AND CERTAIN DISORDERS INVOLVING THE IMMUNE MECHANISM: ICD-10-CM

## 2023-05-18 DIAGNOSIS — Z88.2 ALLERGY STATUS TO SULFONAMIDES: ICD-10-CM

## 2023-05-18 DIAGNOSIS — E86.0 DEHYDRATION: ICD-10-CM

## 2023-05-18 DIAGNOSIS — N18.9 CHRONIC KIDNEY DISEASE, UNSPECIFIED: ICD-10-CM

## 2023-05-18 DIAGNOSIS — E87.5 HYPERKALEMIA: ICD-10-CM

## 2023-05-18 DIAGNOSIS — E11.22 TYPE 2 DIABETES MELLITUS WITH DIABETIC CHRONIC KIDNEY DISEASE: ICD-10-CM

## 2023-05-18 DIAGNOSIS — I12.9 HYPERTENSIVE CHRONIC KIDNEY DISEASE WITH STAGE 1 THROUGH STAGE 4 CHRONIC KIDNEY DISEASE, OR UNSPECIFIED CHRONIC KIDNEY DISEASE: ICD-10-CM

## 2023-05-18 DIAGNOSIS — Z90.49 ACQUIRED ABSENCE OF OTHER SPECIFIED PARTS OF DIGESTIVE TRACT: Chronic | ICD-10-CM

## 2023-05-18 DIAGNOSIS — Z98.890 OTHER SPECIFIED POSTPROCEDURAL STATES: ICD-10-CM

## 2023-05-18 DIAGNOSIS — Z79.4 LONG TERM (CURRENT) USE OF INSULIN: ICD-10-CM

## 2023-05-18 LAB
ALBUMIN SERPL ELPH-MCNC: 2.4 G/DL — LOW (ref 3.3–5)
ALP SERPL-CCNC: 74 U/L — SIGNIFICANT CHANGE UP (ref 40–120)
ALT FLD-CCNC: 17 U/L — SIGNIFICANT CHANGE UP (ref 12–78)
ANION GAP SERPL CALC-SCNC: 3 MMOL/L — LOW (ref 5–17)
AST SERPL-CCNC: 21 U/L — SIGNIFICANT CHANGE UP (ref 15–37)
BASOPHILS # BLD AUTO: 0.04 K/UL — SIGNIFICANT CHANGE UP (ref 0–0.2)
BASOPHILS NFR BLD AUTO: 0.4 % — SIGNIFICANT CHANGE UP (ref 0–2)
BILIRUB SERPL-MCNC: 0.1 MG/DL — LOW (ref 0.2–1.2)
BUN SERPL-MCNC: 49 MG/DL — HIGH (ref 7–23)
CALCIUM SERPL-MCNC: 8.5 MG/DL — SIGNIFICANT CHANGE UP (ref 8.5–10.1)
CHLORIDE SERPL-SCNC: 116 MMOL/L — HIGH (ref 96–108)
CO2 SERPL-SCNC: 25 MMOL/L — SIGNIFICANT CHANGE UP (ref 22–31)
CREAT SERPL-MCNC: 3.34 MG/DL — HIGH (ref 0.5–1.3)
EGFR: 13 ML/MIN/1.73M2 — LOW
EOSINOPHIL # BLD AUTO: 0.07 K/UL — SIGNIFICANT CHANGE UP (ref 0–0.5)
EOSINOPHIL NFR BLD AUTO: 0.7 % — SIGNIFICANT CHANGE UP (ref 0–6)
GLUCOSE SERPL-MCNC: 116 MG/DL — HIGH (ref 70–99)
HCT VFR BLD CALC: 35.9 % — SIGNIFICANT CHANGE UP (ref 34.5–45)
HGB BLD-MCNC: 11.1 G/DL — LOW (ref 11.5–15.5)
IMM GRANULOCYTES NFR BLD AUTO: 0.8 % — SIGNIFICANT CHANGE UP (ref 0–0.9)
LYMPHOCYTES # BLD AUTO: 1.86 K/UL — SIGNIFICANT CHANGE UP (ref 1–3.3)
LYMPHOCYTES # BLD AUTO: 17.5 % — SIGNIFICANT CHANGE UP (ref 13–44)
MAGNESIUM SERPL-MCNC: 2.3 MG/DL — SIGNIFICANT CHANGE UP (ref 1.6–2.6)
MCHC RBC-ENTMCNC: 26.4 PG — LOW (ref 27–34)
MCHC RBC-ENTMCNC: 30.9 G/DL — LOW (ref 32–36)
MCV RBC AUTO: 85.5 FL — SIGNIFICANT CHANGE UP (ref 80–100)
MONOCYTES # BLD AUTO: 0.69 K/UL — SIGNIFICANT CHANGE UP (ref 0–0.9)
MONOCYTES NFR BLD AUTO: 6.5 % — SIGNIFICANT CHANGE UP (ref 2–14)
NEUTROPHILS # BLD AUTO: 7.91 K/UL — HIGH (ref 1.8–7.4)
NEUTROPHILS NFR BLD AUTO: 74.1 % — SIGNIFICANT CHANGE UP (ref 43–77)
NRBC # BLD: 0 /100 WBCS — SIGNIFICANT CHANGE UP (ref 0–0)
PLATELET # BLD AUTO: 359 K/UL — SIGNIFICANT CHANGE UP (ref 150–400)
POTASSIUM SERPL-MCNC: 4.7 MMOL/L — SIGNIFICANT CHANGE UP (ref 3.5–5.3)
POTASSIUM SERPL-SCNC: 4.7 MMOL/L — SIGNIFICANT CHANGE UP (ref 3.5–5.3)
PROT SERPL-MCNC: 6.3 GM/DL — SIGNIFICANT CHANGE UP (ref 6–8.3)
RBC # BLD: 4.2 M/UL — SIGNIFICANT CHANGE UP (ref 3.8–5.2)
RBC # FLD: 19.5 % — HIGH (ref 10.3–14.5)
SODIUM SERPL-SCNC: 144 MMOL/L — SIGNIFICANT CHANGE UP (ref 135–145)
WBC # BLD: 10.65 K/UL — HIGH (ref 3.8–10.5)
WBC # FLD AUTO: 10.65 K/UL — HIGH (ref 3.8–10.5)

## 2023-05-18 PROCEDURE — 99284 EMERGENCY DEPT VISIT MOD MDM: CPT

## 2023-05-18 PROCEDURE — 93010 ELECTROCARDIOGRAM REPORT: CPT

## 2023-05-18 RX ORDER — ATORVASTATIN CALCIUM 80 MG/1
1 TABLET, FILM COATED ORAL
Qty: 0 | Refills: 0 | DISCHARGE

## 2023-05-18 RX ORDER — FERRIC CITRATE 210 MG/1
0 TABLET, COATED ORAL
Qty: 0 | Refills: 0 | DISCHARGE

## 2023-05-18 RX ORDER — LOSARTAN POTASSIUM 100 MG/1
1 TABLET, FILM COATED ORAL
Qty: 0 | Refills: 0 | DISCHARGE

## 2023-05-18 RX ORDER — SODIUM CHLORIDE 9 MG/ML
1000 INJECTION INTRAMUSCULAR; INTRAVENOUS; SUBCUTANEOUS ONCE
Refills: 0 | Status: COMPLETED | OUTPATIENT
Start: 2023-05-18 | End: 2023-05-18

## 2023-05-18 RX ORDER — HYDRALAZINE/HYDROCHLOROTHIAZID 50 MG-50MG
0 CAPSULE ORAL
Qty: 0 | Refills: 0 | DISCHARGE

## 2023-05-18 RX ORDER — FUROSEMIDE 40 MG
0 TABLET ORAL
Qty: 0 | Refills: 0 | DISCHARGE

## 2023-05-18 RX ADMIN — SODIUM CHLORIDE 1000 MILLILITER(S): 9 INJECTION INTRAMUSCULAR; INTRAVENOUS; SUBCUTANEOUS at 20:31

## 2023-05-18 NOTE — ED PROVIDER NOTE - CARE PROVIDER_API CALL
Mirza Dennis)  Internal Medicine; Nephrology  [address] [facility], [address] [place] [id number]  Phone: [phone]  Fax: [phone]  Follow Up Time: Urgent

## 2023-05-18 NOTE — ED PROVIDER NOTE - CLINICAL SUMMARY MEDICAL DECISION MAKING FREE TEXT BOX
76 yo F with elevated potassium, no complaints  -recheck level, monitor  -cbc, cmp, mag, iv, hydrate  -f/u results, reeval

## 2023-05-18 NOTE — ED ADULT NURSE NOTE - NSHOSCREENINGQ1_ED_ALL_ED
PT C/O OF COUGH AND PAIN ON COUGHING. ADMINISTERED ONE DOSE OF ROBITUSSIN WITH CODEINE AD 
ORDERED. IN STABLE CONDITION. WILL CONT TO MONITOR. No

## 2023-05-18 NOTE — ED ADULT NURSE REASSESSMENT NOTE - NS ED NURSE REASSESS COMMENT FT1
Pt AOx4 and reports feeling dizzy. Pt's daughter at the bedside and reports the pt sees floaters. MD aware and will continue to monitor.

## 2023-05-18 NOTE — ED ADULT TRIAGE NOTE - CHIEF COMPLAINT QUOTE
sent to the ed by pmd for elevated potassium (5.1). history of dm, kidney failure and htn. no history of dialysis.

## 2023-05-18 NOTE — ED PROVIDER NOTE - PROGRESS NOTE DETAILS
Results reported to patient--potassium normal, CKD is slowly worsening with evidence of dehydration today  Pt. reports feeling better after hydration  pt. agrees to f/u with primary care outpt. asap, nephro referral given urgently for close f/u of renal function   pt. understands to return to ED if symptoms worsen, pt. understands that if renal function continues to worsen she may require dialysis or lose her kidneys--she verbalizes the importance of f/u and agrees with plan of care; will d/c

## 2023-05-18 NOTE — ED ADULT NURSE NOTE - NSFALLUNIVINTERV_ED_ALL_ED
Bed/Stretcher in lowest position, wheels locked, appropriate side rails in place/Call bell, personal items and telephone in reach/Instruct patient to call for assistance before getting out of bed/chair/stretcher/Non-slip footwear applied when patient is off stretcher/[place] to call system/Physically safe environment - no spills, clutter or unnecessary equipment/Purposeful proactive rounding/Room/bathroom lighting operational, light cord in reach

## 2023-05-18 NOTE — ED ADULT NURSE REASSESSMENT NOTE - NS ED NURSE REASSESS COMMENT FT1
Pt AOx4 with steady gait.Pt states shes going to take her BP medication at home. Pt accepts the d/c from the MD. IV hep lock removed/

## 2023-05-18 NOTE — ED PROVIDER NOTE - OBJECTIVE STATEMENT
78 yo F with elevated potassium on routine lab testing yesterday, measured at 5.1.  Pt. referred to nearest ER for eval.  Pt. currently has no complaints.   ROS: negative for fever, cough, headache, chest pain, shortness of breath, abd pain, nausea, vomiting, diarrhea, rash, paresthesia, and weakness--all other systems reviewed are negative.   PMH: CKD, DM, anemia, and GI bleed s/p endoscopy and colonoscopy; Meds: See EMR for list; SH: Denies smoking/drinking/drug use

## 2023-05-18 NOTE — ED PROVIDER NOTE - CARE PLAN
Principal Discharge DX:	Hyperkalemia   1 Principal Discharge DX:	Hyperkalemia  Secondary Diagnosis:	Chronic kidney disease, unspecified CKD stage

## 2023-05-18 NOTE — ED PROVIDER NOTE - PATIENT PORTAL LINK FT
You can access the FollowMyHealth Patient Portal offered by [facility] by registering at the following website: [email or web address]. By joining [facility]’s FollowMyHealth portal, you will also be able to view your health information using other applications (apps) compatible with our system.

## 2023-05-18 NOTE — ED PROVIDER NOTE - PHYSICAL EXAMINATION
Vitals: HTN at 159/61, otherwise WNL  Gen: AAOx3, NAD, sitting comfortably in stretcher  Head: ncat, perrla, eomi b/l  Neck: supple, no lymphadenopathy, no midline deviation  Heart: rrr, no m/r/g  Lungs: CTA b/l, no rales/ronchi/wheezes  Abd: soft, nontender, non-distended, no rebound or guarding  Ext: no clubbing/cyanosis/edema  Neuro: sensation and muscle strength intact b/l, steady gait, no focal weakness or sensory loss

## 2023-09-18 ENCOUNTER — OFFICE (OUTPATIENT)
Facility: LOCATION | Age: 78
Setting detail: OPHTHALMOLOGY
End: 2023-09-18
Payer: COMMERCIAL

## 2023-09-18 DIAGNOSIS — H25.13: ICD-10-CM

## 2023-09-18 DIAGNOSIS — H25.12: ICD-10-CM

## 2023-09-18 DIAGNOSIS — H40.063: ICD-10-CM

## 2023-09-18 DIAGNOSIS — E10.3293: ICD-10-CM

## 2023-09-18 PROBLEM — H25.11 CATARACT SENILE NUCLEAR SCLEROSIS; RIGHT EYE, LEFT EYE, BOTH EYES: Status: ACTIVE | Noted: 2023-09-18

## 2023-09-18 PROBLEM — H16.223 DRY EYE SYNDROME K SICCA; BOTH EYES: Status: ACTIVE | Noted: 2023-09-18

## 2023-09-18 PROCEDURE — 92136 OPHTHALMIC BIOMETRY: CPT | Performed by: OPHTHALMOLOGY

## 2023-09-18 PROCEDURE — 92134 CPTRZ OPH DX IMG PST SGM RTA: CPT | Performed by: OPHTHALMOLOGY

## 2023-09-18 PROCEDURE — 92020 GONIOSCOPY: CPT | Performed by: OPHTHALMOLOGY

## 2023-09-18 PROCEDURE — 99204 OFFICE O/P NEW MOD 45 MIN: CPT | Performed by: OPHTHALMOLOGY

## 2023-09-18 ASSESSMENT — KERATOMETRY
OD_K1K2_AVERAGE: 44.125
OD_K1POWER_DIOPTERS: 44.00
OS_K2POWER_DIOPTERS: 44.00
OD_AXISANGLE2_DEGREES: 88
OS_AXISANGLE_DEGREES: 87
OD_AXISANGLE_DEGREES: 88
OS_CYLPOWER_DEGREES: 0.75
OS_K1K2_AVERAGE: 44.375
OS_CYLAXISANGLE_DEGREES: 87
OS_K2POWER_DIOPTERS: 44.00
OS_AXISANGLE2_DEGREES: 177
OD_K2POWER_DIOPTERS: 44.25
OD_CYLPOWER_DEGREES: 0.25
OS_K1POWER_DIOPTERS: 44.75
OD_CYLAXISANGLE_DEGREES: 88
OS_K1POWER_DIOPTERS: 44.75
OD_K2POWER_DIOPTERS: 44.25
OD_AXISANGLE_DEGREES: 178
OD_K1POWER_DIOPTERS: 44.00
OS_AXISANGLE_DEGREES: 87

## 2023-09-18 ASSESSMENT — AXIALLENGTH_DERIVED
OS_AL: 22.2334
OD_AL: 22.5353

## 2023-09-18 ASSESSMENT — REFRACTION_MANIFEST
OS_CYLINDER: -2.25
OD_VA1: 20/40
OS_AXIS: 085
OD_ADD: +2.50
OD_SPHERE: +3.50
OD_AXIS: 090
OS_SPHERE: +4.00
OS_ADD: +2.50
OS_VA1: 20/50
OD_CYLINDER: -2.50

## 2023-09-18 ASSESSMENT — REFRACTION_CURRENTRX
OD_AXIS: 091
OS_OVR_VA: 20/
OD_OVR_VA: 20/
OD_ADD: +2.50
OS_ADD: +2.50
OD_SPHERE: +3.50
OD_CYLINDER: -2.50
OS_AXIS: 087
OS_SPHERE: +4.00
OS_CYLINDER: -2.25

## 2023-09-18 ASSESSMENT — SUPERFICIAL PUNCTATE KERATITIS (SPK)
OD_SPK: 2+
OS_SPK: 2+

## 2023-09-18 ASSESSMENT — TONOMETRY
OD_IOP_MMHG: 16
OS_IOP_MMHG: 16

## 2023-09-18 ASSESSMENT — VISUAL ACUITY
OD_BCVA: 20/50-
OS_BCVA: 20/40

## 2023-09-18 ASSESSMENT — REFRACTION_AUTOREFRACTION
OS_SPHERE: UNABLE
OD_SPHERE: UNABLE

## 2023-09-18 ASSESSMENT — CONFRONTATIONAL VISUAL FIELD TEST (CVF)
OD_FINDINGS: FULL
OS_FINDINGS: FULL

## 2023-09-18 ASSESSMENT — SPHEQUIV_DERIVED
OS_SPHEQUIV: 2.875
OD_SPHEQUIV: 2.25

## 2024-04-16 ENCOUNTER — OFFICE (OUTPATIENT)
Facility: LOCATION | Age: 79
Setting detail: OPHTHALMOLOGY
End: 2024-04-16
Payer: COMMERCIAL

## 2024-04-16 DIAGNOSIS — H40.063: ICD-10-CM

## 2024-04-16 DIAGNOSIS — H25.13: ICD-10-CM

## 2024-04-16 PROCEDURE — 92014 COMPRE OPH EXAM EST PT 1/>: CPT | Performed by: OPHTHALMOLOGY

## 2024-04-22 ENCOUNTER — ASC (OUTPATIENT)
Dept: URBAN - METROPOLITAN AREA SURGERY 8 | Facility: SURGERY | Age: 79
Setting detail: OPHTHALMOLOGY
End: 2024-04-22
Payer: COMMERCIAL

## 2024-04-22 DIAGNOSIS — H25.12: ICD-10-CM

## 2024-04-22 PROCEDURE — 66984 XCAPSL CTRC RMVL W/O ECP: CPT | Mod: LT | Performed by: OPHTHALMOLOGY

## 2024-04-23 ENCOUNTER — OFFICE (OUTPATIENT)
Facility: LOCATION | Age: 79
Setting detail: OPHTHALMOLOGY
End: 2024-04-23
Payer: COMMERCIAL

## 2024-04-23 ENCOUNTER — RX ONLY (RX ONLY)
Age: 79
End: 2024-04-23

## 2024-04-23 DIAGNOSIS — H25.11: ICD-10-CM

## 2024-04-23 PROCEDURE — 99024 POSTOP FOLLOW-UP VISIT: CPT | Performed by: OPHTHALMOLOGY

## 2024-04-23 PROCEDURE — 92136 OPHTHALMIC BIOMETRY: CPT | Mod: RT | Performed by: OPHTHALMOLOGY

## 2024-04-29 ENCOUNTER — ASC (OUTPATIENT)
Dept: URBAN - METROPOLITAN AREA SURGERY 8 | Facility: SURGERY | Age: 79
Setting detail: OPHTHALMOLOGY
End: 2024-04-29
Payer: COMMERCIAL

## 2024-04-29 DIAGNOSIS — H25.11: ICD-10-CM

## 2024-04-29 PROCEDURE — 66984 XCAPSL CTRC RMVL W/O ECP: CPT | Mod: 79,RT | Performed by: OPHTHALMOLOGY

## 2024-04-30 ENCOUNTER — OFFICE (OUTPATIENT)
Facility: LOCATION | Age: 79
Setting detail: OPHTHALMOLOGY
End: 2024-04-30
Payer: COMMERCIAL

## 2024-04-30 DIAGNOSIS — Z96.1: ICD-10-CM

## 2024-04-30 DIAGNOSIS — H40.063: ICD-10-CM

## 2024-04-30 PROCEDURE — 99024 POSTOP FOLLOW-UP VISIT: CPT | Performed by: OPHTHALMOLOGY

## 2024-05-08 ENCOUNTER — OFFICE (OUTPATIENT)
Facility: LOCATION | Age: 79
Setting detail: OPHTHALMOLOGY
End: 2024-05-08
Payer: COMMERCIAL

## 2024-05-08 ENCOUNTER — RX ONLY (RX ONLY)
Age: 79
End: 2024-05-08

## 2024-05-08 DIAGNOSIS — Z96.1: ICD-10-CM

## 2024-05-08 DIAGNOSIS — H40.063: ICD-10-CM

## 2024-05-08 PROCEDURE — 99024 POSTOP FOLLOW-UP VISIT: CPT | Performed by: OPHTHALMOLOGY

## 2024-05-08 ASSESSMENT — CONFRONTATIONAL VISUAL FIELD TEST (CVF)
OS_FINDINGS: FULL
OD_FINDINGS: FULL

## 2024-05-29 ENCOUNTER — OFFICE (OUTPATIENT)
Facility: LOCATION | Age: 79
Setting detail: OPHTHALMOLOGY
End: 2024-05-29
Payer: COMMERCIAL

## 2024-05-29 DIAGNOSIS — H52.4: ICD-10-CM

## 2024-05-29 DIAGNOSIS — H40.063: ICD-10-CM

## 2024-05-29 DIAGNOSIS — Z96.1: ICD-10-CM

## 2024-05-29 PROCEDURE — 99024 POSTOP FOLLOW-UP VISIT: CPT | Performed by: OPHTHALMOLOGY

## 2024-05-29 PROCEDURE — 92015 DETERMINE REFRACTIVE STATE: CPT | Performed by: OPHTHALMOLOGY

## 2024-05-29 ASSESSMENT — CONFRONTATIONAL VISUAL FIELD TEST (CVF)
OS_FINDINGS: FULL
OD_FINDINGS: FULL

## 2024-08-21 ENCOUNTER — APPOINTMENT (OUTPATIENT)
Dept: ENDOCRINOLOGY | Facility: CLINIC | Age: 79
End: 2024-08-21
Payer: MEDICARE

## 2024-08-21 VITALS
HEART RATE: 76 BPM | TEMPERATURE: 98.2 F | SYSTOLIC BLOOD PRESSURE: 146 MMHG | WEIGHT: 140 LBS | DIASTOLIC BLOOD PRESSURE: 67 MMHG | BODY MASS INDEX: 23.9 KG/M2 | HEIGHT: 64 IN | RESPIRATION RATE: 16 BRPM | OXYGEN SATURATION: 99 %

## 2024-08-21 DIAGNOSIS — I10 ESSENTIAL (PRIMARY) HYPERTENSION: ICD-10-CM

## 2024-08-21 PROCEDURE — 99205 OFFICE O/P NEW HI 60 MIN: CPT

## 2024-08-21 PROCEDURE — G2211 COMPLEX E/M VISIT ADD ON: CPT

## 2024-08-21 PROCEDURE — 83036 HEMOGLOBIN GLYCOSYLATED A1C: CPT | Mod: QW

## 2024-08-21 PROCEDURE — 82962 GLUCOSE BLOOD TEST: CPT

## 2024-08-21 RX ORDER — BLOOD-GLUCOSE SENSOR
EACH MISCELLANEOUS
Qty: 2 | Refills: 11 | Status: ACTIVE | COMMUNITY
Start: 2024-08-21 | End: 1900-01-01

## 2024-08-21 RX ORDER — INSULIN LISPRO 100 U/ML
100 INJECTION, SOLUTION SUBCUTANEOUS 3 TIMES DAILY
Qty: 3 | Refills: 3 | Status: ACTIVE | COMMUNITY
Start: 2024-08-21 | End: 1900-01-01

## 2024-08-21 RX ORDER — INSULIN DEGLUDEC INJECTION 100 U/ML
100 INJECTION, SOLUTION SUBCUTANEOUS AT BEDTIME
Qty: 3 | Refills: 3 | Status: ACTIVE | COMMUNITY
Start: 2024-08-21 | End: 1900-01-01

## 2024-08-22 LAB
GLUCOSE BLDC GLUCOMTR-MCNC: 292
HBA1C MFR BLD HPLC: ABNORMAL

## 2024-08-22 NOTE — ASSESSMENT
[Diabetes Foot Care] : diabetes foot care [Long Term Vascular Complications] : long term vascular complications of diabetes [Carbohydrate Consistent Diet] : carbohydrate consistent diet [Importance of Diet and Exercise] : importance of diet and exercise to improve glycemic control, achieve weight loss and improve cardiovascular health [Exercise/Effect on Glucose] : exercise/effect on glucose [Hypoglycemia Management] : hypoglycemia management [Glucagon Use] : glucagon use [Ketone Testing] : ketone testing [Action and use of Insulin] : action and use of short and long-acting insulin [Self Monitoring of Blood Glucose] : self monitoring of blood glucose [Insulin Self-Administration] : insulin self-administration [Injection Technique, Storage, Sharps Disposal] : injection technique, storage, and sharps disposal [Sick-Day Management] : sick-day management [Retinopathy Screening] : Patient was referred to ophthalmology for retinopathy screening [FreeTextEntry1] : Type 2 Diabetes uncontrolled: A1c: 13% Glucose: 292 Current regimen: Admelog 2 units before meals and low dose sliding scale New regimen: Reviewing Neal it appears patient remains high all day despite eating or not. In need of some basal insulin. Noted in hospital records of glucose 63 after Lantus 5units. Will start 2 units Basal to assess effectiveness  ophthalmology exam: up to date podiatry exam: has not gone  LDL:  GFR: ESRD ace/arb: written for losartan but with ESRD - advised to stop unless otherwise advised by Nephrologist  statin: yes BP: 146/67 BMI: 24

## 2024-08-22 NOTE — PHYSICAL EXAM
[Alert] : alert [Well Nourished] : well nourished [No Acute Distress] : no acute distress [Well Developed] : well developed [Normal Sclera/Conjunctiva] : normal sclera/conjunctiva [EOMI] : extra ocular movement intact [No Proptosis] : no proptosis [Normal Oropharynx] : the oropharynx was normal [Thyroid Not Enlarged] : the thyroid was not enlarged [No Thyroid Nodules] : no palpable thyroid nodules [No Respiratory Distress] : no respiratory distress [No Accessory Muscle Use] : no accessory muscle use [Clear to Auscultation] : lungs were clear to auscultation bilaterally [Normal S1, S2] : normal S1 and S2 [Normal Rate] : heart rate was normal [Regular Rhythm] : with a regular rhythm [No Edema] : no peripheral edema [Pedal Pulses Normal] : the pedal pulses are present [Normal Bowel Sounds] : normal bowel sounds [Not Tender] : non-tender [Not Distended] : not distended [Soft] : abdomen soft [Normal Anterior Cervical Nodes] : no anterior cervical lymphadenopathy [Normal Posterior Cervical Nodes] : no posterior cervical lymphadenopathy [No Spinal Tenderness] : no spinal tenderness [Spine Straight] : spine straight [No Stigmata of Cushings Syndrome] : no stigmata of Cushings Syndrome [Normal Gait] : normal gait [Normal Strength/Tone] : muscle strength and tone were normal [No Rash] : no rash [Acanthosis Nigricans] : no acanthosis nigricans [Normal Reflexes] : deep tendon reflexes were 2+ and symmetric [No Tremors] : no tremors [Oriented x3] : oriented to person, place, and time [de-identified] : elderly female  [de-identified] : has AVF

## 2024-08-22 NOTE — PHYSICAL EXAM
[Alert] : alert [Well Nourished] : well nourished [No Acute Distress] : no acute distress [Well Developed] : well developed [Normal Sclera/Conjunctiva] : normal sclera/conjunctiva [EOMI] : extra ocular movement intact [No Proptosis] : no proptosis [Normal Oropharynx] : the oropharynx was normal [Thyroid Not Enlarged] : the thyroid was not enlarged [No Thyroid Nodules] : no palpable thyroid nodules [No Respiratory Distress] : no respiratory distress [No Accessory Muscle Use] : no accessory muscle use [Clear to Auscultation] : lungs were clear to auscultation bilaterally [Normal S1, S2] : normal S1 and S2 [Normal Rate] : heart rate was normal [Regular Rhythm] : with a regular rhythm [No Edema] : no peripheral edema [Pedal Pulses Normal] : the pedal pulses are present [Normal Bowel Sounds] : normal bowel sounds [Not Tender] : non-tender [Not Distended] : not distended [Soft] : abdomen soft [Normal Anterior Cervical Nodes] : no anterior cervical lymphadenopathy [Normal Posterior Cervical Nodes] : no posterior cervical lymphadenopathy [No Spinal Tenderness] : no spinal tenderness [Spine Straight] : spine straight [No Stigmata of Cushings Syndrome] : no stigmata of Cushings Syndrome [Normal Gait] : normal gait [Normal Strength/Tone] : muscle strength and tone were normal [No Rash] : no rash [Acanthosis Nigricans] : no acanthosis nigricans [Normal Reflexes] : deep tendon reflexes were 2+ and symmetric [No Tremors] : no tremors [Oriented x3] : oriented to person, place, and time [de-identified] : elderly female  [de-identified] : has AVF

## 2024-08-22 NOTE — HISTORY OF PRESENT ILLNESS
[FreeTextEntry1] : HPI: 78 year old female presenting s/p hospital admission for diabetes management    PMHx: ESRD on dialysis, DM2, OA, HTN   Allergies: Sulfa      Diabetes History:    When and how diagnosed: 20 + years ago diagnosed, dialysis since November 2023. Has been on insulin management for a while. Recent long-acting insulin was stopped due to hypoglycemia. Presented to hospital Aug 2024 with hyperglycemia. Discharged only on bolus insulin  Type of Diabetes:  Hx of DKA or HHS? denies  Current DM medication regimen: Admelog 2 units before meals plus low dose sliding scale   Fingerstick Monitoring: ashley 3 - given  by hospital  Name: Bebe Worthington YOB: 1945 Report Period: 08/09/2024 - 08/22/2024 (14 days) Generated: 08/22/2024 % Time CGM Active: 50%   Glucose Statistics and Targets Average Glucose: 289 mg/dL Glucose Management Indicator (GMI): 10.2% Glucose Variability (%CV): 20.5% Target Range: 70 - 180 mg/dL   Time in Ranges Very High: >250 mg/dL --- 74% High: 181 - 250 mg/dL --- 23% Target Range: 70 - 180 mg/dL --- 3% Low: 54 - 69 mg/dL --- 0% Very Low: <54 mg/dL --- 0%  Last HBA1C: 13%     LIFESTYLE FACTORS:  Eating patterns and weight history:  Diet: Breakfast: little packet of oatmeal, egg Lunch: skips usually, apple or little pineapple cup Dinner: mostly skips-  Snacks: peas and rice - spoonfuls  Beverages: 2 bottles water    Activity/Sleep: limited but remains independent    Complications: denies Macrovascular- CVA/Stroke? CVD? PVD?    Microvascular- Retinopathy?  denies Nephropathy? yes - ESRD  Neuropathy? maybe but started recently    Follow up care: PCP: Kaylin Santizo  Opthalmology: spring 2024, had appt yesterday  Podiatry: has seen in the past  Nutrition:     Surgical History: AVF, catarcts, appendicitiy, tuba lligation    Family History: DM- siblings, children  Thyroid-  Autoimmune- Other-   Social History: occupation- support system- on own, children visit and check in on her ETOH use- Tobacco Hx- Additional substance use-   Additional Medications: OTC vitamins and supplements: Vitamin D    Technology Use: Health Apps, online education, patient portal use?  Glucose Monitoring (meter/CMG): ashley 3

## 2024-08-27 ENCOUNTER — APPOINTMENT (OUTPATIENT)
Dept: ENDOCRINOLOGY | Facility: CLINIC | Age: 79
End: 2024-08-27
Payer: MEDICARE

## 2024-08-27 DIAGNOSIS — N18.6 END STAGE RENAL DISEASE: ICD-10-CM

## 2024-08-27 DIAGNOSIS — Z79.4 TYPE 2 DIABETES MELLITUS WITH OTHER DIABETIC KIDNEY COMPLICATION: ICD-10-CM

## 2024-08-27 DIAGNOSIS — E11.29 TYPE 2 DIABETES MELLITUS WITH OTHER DIABETIC KIDNEY COMPLICATION: ICD-10-CM

## 2024-08-27 DIAGNOSIS — Z99.2 END STAGE RENAL DISEASE: ICD-10-CM

## 2024-08-27 PROCEDURE — 99443: CPT

## 2024-08-27 NOTE — REASON FOR VISIT
[Follow - Up] : a follow-up visit [DM Type 2] : DM Type 2 [Home] : at home, [unfilled] , at the time of the visit. [Medical Office: ([facility])___] : at the medical office located in  [Verbal consent obtained from patient] : the patient, [unfilled]

## 2024-08-27 NOTE — HISTORY OF PRESENT ILLNESS
[FreeTextEntry1] : 78-year-old female with type 2 DM c/b renal failure.     Diabetes History:  When and how diagnosed: 20 + years ago diagnosed, dialysis since November 2023. Has been on insulin management for a while. Recent long-acting insulin was stopped due to hypoglycemia. Presented to hospital Aug 2024 with hyperglycemia. Discharged only on bolus insulin  Type of Diabetes:  Hx of DKA or HHS? denies  Current DM medication regimen: Lantus 2units at bedtime, Admelog 2 units before meals plus low dose sliding scale   Interval Hx: Patient presenting to review diabetes regimen. Neal to be reviewed and insulin dosage to be adjusted. Patient reports blood sugars are still high despite starting the Lantus again. Denies any lows. Reports proper insulin technique.    Name: Bebe Worthington YOB: 1945 Report Period: 08/14/2024 - 08/27/2024 (14 days) Generated: 08/27/2024 % Time CGM Active: 86%   Glucose Statistics and Targets Average Glucose: 269 mg/dL Glucose Management Indicator (GMI): 9.7% Glucose Variability (%CV): 22.7% Target Range: 70 - 180 mg/dL   Time in Ranges Very High: >250 mg/dL --- 62% High: 181 - 250 mg/dL --- 31% Target Range: 70 - 180 mg/dL --- 7% Low: 54 - 69 mg/dL --- 0% Very Low: <54 mg/dL --- 0%

## 2024-08-27 NOTE — ASSESSMENT
[Diabetes Foot Care] : diabetes foot care [Long Term Vascular Complications] : long term vascular complications of diabetes [Carbohydrate Consistent Diet] : carbohydrate consistent diet [Importance of Diet and Exercise] : importance of diet and exercise to improve glycemic control, achieve weight loss and improve cardiovascular health [Exercise/Effect on Glucose] : exercise/effect on glucose [Hypoglycemia Management] : hypoglycemia management [Glucagon Use] : glucagon use [Ketone Testing] : ketone testing [Action and use of Insulin] : action and use of short and long-acting insulin [Self Monitoring of Blood Glucose] : self monitoring of blood glucose [Insulin Self-Administration] : insulin self-administration [Injection Technique, Storage, Sharps Disposal] : injection technique, storage, and sharps disposal [Sick-Day Management] : sick-day management [Retinopathy Screening] : Patient was referred to ophthalmology for retinopathy screening [FreeTextEntry1] : Type 2 Diabetes uncontrolled: on peritoneal dialysis at home  A1c: 13% ---> 9.7% GMI on neal indicating some improvement  Glucose: 262 noted on neal 3  Current regimen: Lantus 2 units Admelog 2 units before meals and low dose sliding scale New regimen: Advised to try Lantus 5units again at bedtime, c/w 2 units Admelog before bedtime.  Will monitor Neal 3 and reduce Lantus down to 3 units if needed.

## 2024-08-29 ENCOUNTER — OFFICE (OUTPATIENT)
Facility: LOCATION | Age: 79
Setting detail: OPHTHALMOLOGY
End: 2024-08-29
Payer: MEDICARE

## 2024-08-29 DIAGNOSIS — H40.063: ICD-10-CM

## 2024-08-29 DIAGNOSIS — Z96.1: ICD-10-CM

## 2024-08-29 DIAGNOSIS — H16.223: ICD-10-CM

## 2024-08-29 PROBLEM — H40.023 GLAUCOMA SUSPECT, HIGH RISK; BOTH EYES: Status: ACTIVE | Noted: 2024-08-29

## 2024-08-29 PROCEDURE — 92133 CPTRZD OPH DX IMG PST SGM ON: CPT | Performed by: OPHTHALMOLOGY

## 2024-08-29 PROCEDURE — 92083 EXTENDED VISUAL FIELD XM: CPT | Performed by: OPHTHALMOLOGY

## 2024-08-29 PROCEDURE — 92014 COMPRE OPH EXAM EST PT 1/>: CPT | Performed by: OPHTHALMOLOGY

## 2024-08-29 ASSESSMENT — CONFRONTATIONAL VISUAL FIELD TEST (CVF)
OS_FINDINGS: FULL
OD_FINDINGS: FULL

## 2024-09-19 NOTE — ED ADULT TRIAGE NOTE - WEIGHT IN KG
Follows with Dr. Dougherty - most recent appt 9/9/24:  1. CAD. 2019 he had bypass with a LIMA to the LAD, SVG to the diagonal, marginal, and PDA. Doing well. Exercise nuclear stress testing 4/17/2023 revealed average capacity for age. He had subtle EKG changes. No symptoms. Nuclear imaging entirely normal. Continue with aspirin, losartan, and rosuvastatin.    75.3

## 2024-10-02 ENCOUNTER — APPOINTMENT (OUTPATIENT)
Dept: ENDOCRINOLOGY | Facility: CLINIC | Age: 79
End: 2024-10-02
Payer: MEDICARE

## 2024-10-02 VITALS
SYSTOLIC BLOOD PRESSURE: 145 MMHG | BODY MASS INDEX: 23.39 KG/M2 | HEIGHT: 64 IN | DIASTOLIC BLOOD PRESSURE: 58 MMHG | HEART RATE: 66 BPM | OXYGEN SATURATION: 98 % | RESPIRATION RATE: 16 BRPM | TEMPERATURE: 98.5 F | WEIGHT: 137 LBS

## 2024-10-02 DIAGNOSIS — E11.29 TYPE 2 DIABETES MELLITUS WITH OTHER DIABETIC KIDNEY COMPLICATION: ICD-10-CM

## 2024-10-02 DIAGNOSIS — N18.6 END STAGE RENAL DISEASE: ICD-10-CM

## 2024-10-02 DIAGNOSIS — Z79.4 TYPE 2 DIABETES MELLITUS WITH OTHER DIABETIC KIDNEY COMPLICATION: ICD-10-CM

## 2024-10-02 DIAGNOSIS — Z99.2 END STAGE RENAL DISEASE: ICD-10-CM

## 2024-10-02 DIAGNOSIS — I10 ESSENTIAL (PRIMARY) HYPERTENSION: ICD-10-CM

## 2024-10-02 PROCEDURE — 83036 HEMOGLOBIN GLYCOSYLATED A1C: CPT | Mod: QW

## 2024-10-02 PROCEDURE — G2211 COMPLEX E/M VISIT ADD ON: CPT

## 2024-10-02 PROCEDURE — 99214 OFFICE O/P EST MOD 30 MIN: CPT

## 2024-10-02 PROCEDURE — 82962 GLUCOSE BLOOD TEST: CPT

## 2024-10-02 RX ORDER — INSULIN GLARGINE 100 [IU]/ML
100 INJECTION, SOLUTION SUBCUTANEOUS
Qty: 1 | Refills: 11 | Status: ACTIVE | COMMUNITY
Start: 2024-10-02 | End: 1900-01-01

## 2024-10-03 NOTE — PHYSICAL EXAM
[Alert] : alert [Well Nourished] : well nourished [No Acute Distress] : no acute distress [Well Developed] : well developed [Normal Sclera/Conjunctiva] : normal sclera/conjunctiva [EOMI] : extra ocular movement intact [No Proptosis] : no proptosis [Normal Oropharynx] : the oropharynx was normal [Thyroid Not Enlarged] : the thyroid was not enlarged [No Thyroid Nodules] : no palpable thyroid nodules [No Respiratory Distress] : no respiratory distress [No Accessory Muscle Use] : no accessory muscle use [Clear to Auscultation] : lungs were clear to auscultation bilaterally [Normal S1, S2] : normal S1 and S2 [Normal Rate] : heart rate was normal [Regular Rhythm] : with a regular rhythm [No Edema] : no peripheral edema [Pedal Pulses Normal] : the pedal pulses are present [Normal Bowel Sounds] : normal bowel sounds [Not Tender] : non-tender [Not Distended] : not distended [Soft] : abdomen soft [Normal Anterior Cervical Nodes] : no anterior cervical lymphadenopathy [No Spinal Tenderness] : no spinal tenderness [Spine Straight] : spine straight [No Stigmata of Cushings Syndrome] : no stigmata of Cushings Syndrome [Normal Gait] : normal gait [Normal Strength/Tone] : muscle strength and tone were normal [No Rash] : no rash [Acanthosis Nigricans] : no acanthosis nigricans [Normal Reflexes] : deep tendon reflexes were 2+ and symmetric [No Tremors] : no tremors [Oriented x3] : oriented to person, place, and time [de-identified] : elderly female  [de-identified] : set up for peritoneal dialysis

## 2024-10-03 NOTE — PHYSICAL EXAM
[Alert] : alert [Well Nourished] : well nourished [No Acute Distress] : no acute distress [Well Developed] : well developed [Normal Sclera/Conjunctiva] : normal sclera/conjunctiva [EOMI] : extra ocular movement intact [No Proptosis] : no proptosis [Normal Oropharynx] : the oropharynx was normal [Thyroid Not Enlarged] : the thyroid was not enlarged [No Thyroid Nodules] : no palpable thyroid nodules [No Respiratory Distress] : no respiratory distress [No Accessory Muscle Use] : no accessory muscle use [Clear to Auscultation] : lungs were clear to auscultation bilaterally [Normal S1, S2] : normal S1 and S2 [Normal Rate] : heart rate was normal [Regular Rhythm] : with a regular rhythm [No Edema] : no peripheral edema [Pedal Pulses Normal] : the pedal pulses are present [Normal Bowel Sounds] : normal bowel sounds [Not Tender] : non-tender [Not Distended] : not distended [Soft] : abdomen soft [Normal Anterior Cervical Nodes] : no anterior cervical lymphadenopathy [No Spinal Tenderness] : no spinal tenderness [Spine Straight] : spine straight [No Stigmata of Cushings Syndrome] : no stigmata of Cushings Syndrome [Normal Gait] : normal gait [Normal Strength/Tone] : muscle strength and tone were normal [No Rash] : no rash [Acanthosis Nigricans] : no acanthosis nigricans [Normal Reflexes] : deep tendon reflexes were 2+ and symmetric [No Tremors] : no tremors [Oriented x3] : oriented to person, place, and time [de-identified] : elderly female  [de-identified] : set up for peritoneal dialysis

## 2024-10-03 NOTE — HISTORY OF PRESENT ILLNESS
[FreeTextEntry1] : 78-year-old female with type 2 DM c/b renal failure.     Diabetes History:  When and how diagnosed: 20 + years ago diagnosed, dialysis since November 2023. Has been on insulin management for a while. Recent long-acting insulin was stopped due to hypoglycemia. Presented to hospital Aug 2024 with hyperglycemia. Discharged only on bolus insulin  Type of Diabetes:  Hx of DKA or HHS? denies    Interval Hx: Patient states after cautiously increased Lantus to 5units she did not experience any low sugars as was previously noted inpatient. She was advised recently to increase Lantus further to 10units and stop Admelog therapy 5 days ago. She has been using the Neal 2 instead of Neal 3. Noticing postprandial highs since stopping admelog. Fasting blood sugar improved   Current DM medication regimen: Lantus 10 units at bedtime, Admelog stopped       - [facility]. new PCP - [place] [address] Tel: [phone]

## 2024-10-03 NOTE — HISTORY OF PRESENT ILLNESS
[FreeTextEntry1] : 78-year-old female with type 2 DM c/b renal failure.     Diabetes History:  When and how diagnosed: 20 + years ago diagnosed, dialysis since November 2023. Has been on insulin management for a while. Recent long-acting insulin was stopped due to hypoglycemia. Presented to hospital Aug 2024 with hyperglycemia. Discharged only on bolus insulin  Type of Diabetes:  Hx of DKA or HHS? denies    Interval Hx: Patient states after cautiously increased Lantus to 5units she did not experience any low sugars as was previously noted inpatient. She was advised recently to increase Lantus further to 10units and stop Admelog therapy 5 days ago. She has been using the Neal 2 instead of Neal 3. Noticing postprandial highs since stopping admelog. Fasting blood sugar improved   Current DM medication regimen: Lantus 10 units at bedtime, Admelog stopped       - [facility]. new PCP - [place] [address] Tel: [phone]

## 2024-10-03 NOTE — ASSESSMENT
[Diabetes Foot Care] : diabetes foot care [Long Term Vascular Complications] : long term vascular complications of diabetes [Carbohydrate Consistent Diet] : carbohydrate consistent diet [Importance of Diet and Exercise] : importance of diet and exercise to improve glycemic control, achieve weight loss and improve cardiovascular health [Exercise/Effect on Glucose] : exercise/effect on glucose [Hypoglycemia Management] : hypoglycemia management [Glucagon Use] : glucagon use [Ketone Testing] : ketone testing [Action and use of Insulin] : action and use of short and long-acting insulin [Self Monitoring of Blood Glucose] : self monitoring of blood glucose [Insulin Self-Administration] : insulin self-administration [Injection Technique, Storage, Sharps Disposal] : injection technique, storage, and sharps disposal [Sick-Day Management] : sick-day management [Retinopathy Screening] : Patient was referred to ophthalmology for retinopathy screening [FreeTextEntry1] : Type 2 Diabetes uncontrolled: on peritoneal dialysis at home  A1c: 13% --->8.4% Glucose: 199 Current regimen: Lantus 10 units  New regimen: Lantus 10 units , Admelog 2units before meals (noticing elevations postprandially without insulin) Will resend for ashley 3 as patient in need of continuous monitoring High risk for hypoglycemia in setting of renal disease and age while on insulin therapy Will get blood work results from PCP

## 2024-10-07 LAB
GLUCOSE BLDC GLUCOMTR-MCNC: 199
HBA1C MFR BLD HPLC: ABNORMAL

## 2024-10-21 ENCOUNTER — APPOINTMENT (OUTPATIENT)
Dept: TRANSPLANT | Facility: CLINIC | Age: 79
End: 2024-10-21
Payer: COMMERCIAL

## 2024-10-21 ENCOUNTER — APPOINTMENT (OUTPATIENT)
Dept: NEPHROLOGY | Facility: CLINIC | Age: 79
End: 2024-10-21
Payer: COMMERCIAL

## 2024-10-21 VITALS
SYSTOLIC BLOOD PRESSURE: 146 MMHG | HEART RATE: 75 BPM | WEIGHT: 136 LBS | BODY MASS INDEX: 23.22 KG/M2 | OXYGEN SATURATION: 99 % | TEMPERATURE: 97.8 F | HEIGHT: 64 IN | DIASTOLIC BLOOD PRESSURE: 75 MMHG

## 2024-10-21 DIAGNOSIS — N18.6 END STAGE RENAL DISEASE: ICD-10-CM

## 2024-10-21 DIAGNOSIS — Z01.818 ENCOUNTER FOR OTHER PREPROCEDURAL EXAMINATION: ICD-10-CM

## 2024-10-21 DIAGNOSIS — Z99.2 END STAGE RENAL DISEASE: ICD-10-CM

## 2024-10-21 DIAGNOSIS — I10 ESSENTIAL (PRIMARY) HYPERTENSION: ICD-10-CM

## 2024-10-21 PROCEDURE — 99215 OFFICE O/P EST HI 40 MIN: CPT

## 2024-10-21 PROCEDURE — 99205 OFFICE O/P NEW HI 60 MIN: CPT

## 2024-10-21 RX ORDER — ATORVASTATIN CALCIUM 40 MG/1
40 TABLET, FILM COATED ORAL
Qty: 30 | Refills: 2 | Status: ACTIVE | COMMUNITY
Start: 2024-10-21

## 2024-10-21 RX ORDER — PANTOPRAZOLE 40 MG/1
40 TABLET, DELAYED RELEASE ORAL
Qty: 30 | Refills: 3 | Status: ACTIVE | COMMUNITY
Start: 2024-10-21

## 2024-10-21 RX ORDER — CARVEDILOL 25 MG/1
25 TABLET, FILM COATED ORAL TWICE DAILY
Qty: 60 | Refills: 3 | Status: ACTIVE | COMMUNITY
Start: 2024-10-21

## 2024-10-21 RX ORDER — LOSARTAN POTASSIUM 100 MG/1
100 TABLET, FILM COATED ORAL DAILY
Qty: 3 | Refills: 3 | Status: ACTIVE | COMMUNITY
Start: 2024-10-21

## 2024-10-22 ENCOUNTER — NON-APPOINTMENT (OUTPATIENT)
Age: 79
End: 2024-10-22

## 2024-10-22 LAB
ABO + RH PNL BLD: NORMAL
ALBUMIN SERPL ELPH-MCNC: 4 G/DL
ALP BLD-CCNC: 66 U/L
ALT SERPL-CCNC: 7 U/L
ANION GAP SERPL CALC-SCNC: 19 MMOL/L
APPEARANCE: ABNORMAL
AST SERPL-CCNC: 12 U/L
BACTERIA: ABNORMAL /HPF
BILIRUB SERPL-MCNC: 0.2 MG/DL
BILIRUBIN URINE: NEGATIVE
BLOOD URINE: NEGATIVE
BUN SERPL-MCNC: 66 MG/DL
C PEPTIDE SERPL-MCNC: 9.4 NG/ML
CALCIUM SERPL-MCNC: 9.8 MG/DL
CAST: 10 /LPF
CHLORIDE SERPL-SCNC: 100 MMOL/L
CHOLEST SERPL-MCNC: 166 MG/DL
CO2 SERPL-SCNC: 24 MMOL/L
COARSE GRANULAR CASTS: PRESENT
COLOR: YELLOW
COVID-19 NUCLEOCAPSID  GAM ANTIBODY INTERPRETATION: NEGATIVE
CREAT SERPL-MCNC: 11.73 MG/DL
CREAT SPEC-SCNC: 88 MG/DL
CREAT/PROT UR: 1.7 RATIO
EBV DNA SERPL NAA+PROBE-ACNC: NOT DETECTED IU/ML
EBVPCR LOG: NOT DETECTED LOG10IU/ML
EGFR: 3 ML/MIN/1.73M2
EPITHELIAL CELLS: 9 /HPF
ESTIMATED AVERAGE GLUCOSE: 203 MG/DL
GLUCOSE QUALITATIVE U: 250 MG/DL
GLUCOSE SERPL-MCNC: 207 MG/DL
HBA1C MFR BLD HPLC: 8.7 %
HCT VFR BLD CALC: 41.1 %
HDLC SERPL-MCNC: 50 MG/DL
HGB BLD-MCNC: 12.7 G/DL
HIV1+2 AB SPEC QL IA.RAPID: NONREACTIVE
KETONES URINE: NEGATIVE MG/DL
LDLC SERPL CALC-MCNC: 90 MG/DL
LEUKOCYTE ESTERASE URINE: ABNORMAL
MAGNESIUM SERPL-MCNC: 3.1 MG/DL
MCHC RBC-ENTMCNC: 27.7 PG
MCHC RBC-ENTMCNC: 30.9 GM/DL
MCV RBC AUTO: 89.5 FL
MICROSCOPIC-UA: NORMAL
NITRITE URINE: NEGATIVE
NONHDLC SERPL-MCNC: 116 MG/DL
PH URINE: 7
PHOSPHATE SERPL-MCNC: 7 MG/DL
PLATELET # BLD AUTO: 263 K/UL
POTASSIUM SERPL-SCNC: 6.2 MMOL/L
PROT SERPL-MCNC: 7.4 G/DL
PROT UR-MCNC: 147 MG/DL
PROTEIN URINE: 300 MG/DL
PSA SERPL-MCNC: <0.01 NG/ML
RBC # BLD: 4.59 M/UL
RBC # FLD: 15.9 %
RED BLOOD CELLS URINE: 1 /HPF
REVIEW: NORMAL
SARS-COV-2 AB SERPL QL IA: 0.72 INDEX
SODIUM SERPL-SCNC: 143 MMOL/L
SPECIFIC GRAVITY URINE: 1.02
TRIGL SERPL-MCNC: 154 MG/DL
UROBILINOGEN URINE: 0.2 MG/DL
WBC # FLD AUTO: 6.57 K/UL
WHITE BLOOD CELLS URINE: 24 /HPF

## 2024-10-24 LAB
CMV IGG SERPL QL: 5.2 U/ML
CMV IGG SERPL-IMP: POSITIVE
EBV EA AB SER IA-ACNC: <5 U/ML
EBV EA AB TITR SER IF: POSITIVE
EBV EA IGG SER QL IA: >600 U/ML
EBV EA IGG SER-ACNC: NEGATIVE
EBV EA IGM SER IA-ACNC: NEGATIVE
EBV PATRN SPEC IB-IMP: NORMAL
EBV VCA IGG SER IA-ACNC: 14.1 U/ML
EBV VCA IGM SER QL IA: <10 U/ML
EPSTEIN-BARR VIRUS CAPSID ANTIGEN IGG: NEGATIVE
HAV IGM SER QL: NONREACTIVE
HBV CORE IGG+IGM SER QL: NONREACTIVE
HBV SURFACE AB SER QL: NONREACTIVE
HBV SURFACE AB SERPL IA-ACNC: 3.5 MIU/ML
HBV SURFACE AG SER QL: NONREACTIVE
HCV AB SER QL: NONREACTIVE
HCV S/CO RATIO: 0.16 S/CO
HEPATITIS A IGG ANTIBODY: REACTIVE
HSV 1+2 IGG SER IA-IMP: POSITIVE
HSV 1+2 IGG SER IA-IMP: POSITIVE
HSV1 IGG SER QL: 21.8 INDEX
HSV2 IGG SER QL: >23.6 INDEX
M TB IFN-G BLD-IMP: NEGATIVE
QUANTIFERON TB PLUS MITOGEN MINUS NIL: >10 IU/ML
QUANTIFERON TB PLUS NIL: 0.03 IU/ML
QUANTIFERON TB PLUS TB1 MINUS NIL: 0 IU/ML
QUANTIFERON TB PLUS TB2 MINUS NIL: 0 IU/ML
RUBV IGG FLD-ACNC: 20.2 INDEX
RUBV IGG SER-IMP: POSITIVE
STRONGYLOIDES AB SER IA-ACNC: NEGATIVE
T GONDII AB SER-IMP: POSITIVE
T GONDII IGG SER QL: 30.4 IU/ML
T PALLIDUM AB SER QL IA: NEGATIVE
VZV AB TITR SER: POSITIVE
VZV IGG SER IF-ACNC: 13.8 S/CO

## 2024-10-30 ENCOUNTER — APPOINTMENT (OUTPATIENT)
Dept: OBGYN | Facility: CLINIC | Age: 79
End: 2024-10-30

## 2024-11-08 ENCOUNTER — APPOINTMENT (OUTPATIENT)
Dept: CARDIOLOGY | Facility: CLINIC | Age: 79
End: 2024-11-08

## 2024-11-08 ENCOUNTER — NON-APPOINTMENT (OUTPATIENT)
Age: 79
End: 2024-11-08

## 2024-11-08 VITALS
DIASTOLIC BLOOD PRESSURE: 64 MMHG | HEART RATE: 75 BPM | OXYGEN SATURATION: 100 % | BODY MASS INDEX: 23.69 KG/M2 | WEIGHT: 138 LBS | SYSTOLIC BLOOD PRESSURE: 110 MMHG

## 2024-11-08 DIAGNOSIS — Z01.818 ENCOUNTER FOR OTHER PREPROCEDURAL EXAMINATION: ICD-10-CM

## 2024-11-08 DIAGNOSIS — I10 ESSENTIAL (PRIMARY) HYPERTENSION: ICD-10-CM

## 2024-11-08 PROCEDURE — G2211 COMPLEX E/M VISIT ADD ON: CPT

## 2024-11-08 PROCEDURE — 99214 OFFICE O/P EST MOD 30 MIN: CPT

## 2024-11-08 PROCEDURE — 93000 ELECTROCARDIOGRAM COMPLETE: CPT | Mod: NC

## 2024-11-12 ENCOUNTER — OUTPATIENT (OUTPATIENT)
Dept: OUTPATIENT SERVICES | Facility: HOSPITAL | Age: 79
LOS: 1 days | End: 2024-11-12
Payer: MEDICARE

## 2024-11-12 ENCOUNTER — APPOINTMENT (OUTPATIENT)
Dept: OBGYN | Facility: CLINIC | Age: 79
End: 2024-11-12
Payer: MEDICARE

## 2024-11-12 VITALS
OXYGEN SATURATION: 100 % | HEIGHT: 64 IN | WEIGHT: 141 LBS | BODY MASS INDEX: 24.07 KG/M2 | RESPIRATION RATE: 16 BRPM | HEART RATE: 74 BPM | DIASTOLIC BLOOD PRESSURE: 64 MMHG | TEMPERATURE: 97.3 F | SYSTOLIC BLOOD PRESSURE: 129 MMHG

## 2024-11-12 DIAGNOSIS — Z00.00 ENCOUNTER FOR GENERAL ADULT MEDICAL EXAMINATION WITHOUT ABNORMAL FINDINGS: ICD-10-CM

## 2024-11-12 DIAGNOSIS — Z99.2 END STAGE RENAL DISEASE: ICD-10-CM

## 2024-11-12 DIAGNOSIS — Z11.3 ENCOUNTER FOR SCREENING FOR INFECTIONS WITH A PREDOMINANTLY SEXUAL MODE OF TRANSMISSION: ICD-10-CM

## 2024-11-12 DIAGNOSIS — Z78.9 OTHER SPECIFIED HEALTH STATUS: ICD-10-CM

## 2024-11-12 DIAGNOSIS — M18.12 UNILATERAL PRIMARY OSTEOARTHRITIS OF FIRST CARPOMETACARPAL JOINT, LEFT HAND: ICD-10-CM

## 2024-11-12 DIAGNOSIS — Z78.0 ASYMPTOMATIC MENOPAUSAL STATE: ICD-10-CM

## 2024-11-12 DIAGNOSIS — Z86.39 PERSONAL HISTORY OF OTHER ENDOCRINE, NUTRITIONAL AND METABOLIC DISEASE: ICD-10-CM

## 2024-11-12 DIAGNOSIS — Z12.4 ENCOUNTER FOR SCREENING FOR MALIGNANT NEOPLASM OF CERVIX: ICD-10-CM

## 2024-11-12 DIAGNOSIS — Z12.39 ENCOUNTER FOR OTHER SCREENING FOR MALIGNANT NEOPLASM OF BREAST: ICD-10-CM

## 2024-11-12 DIAGNOSIS — N18.6 END STAGE RENAL DISEASE: ICD-10-CM

## 2024-11-12 DIAGNOSIS — M18.11 UNILATERAL PRIMARY OSTEOARTHRITIS OF FIRST CARPOMETACARPAL JOINT, RIGHT HAND: ICD-10-CM

## 2024-11-12 DIAGNOSIS — M65.312 TRIGGER THUMB, LEFT THUMB: ICD-10-CM

## 2024-11-12 DIAGNOSIS — N76.1 SUBACUTE AND CHRONIC VAGINITIS: ICD-10-CM

## 2024-11-12 DIAGNOSIS — Z01.812 ENCOUNTER FOR PREPROCEDURAL LABORATORY EXAMINATION: ICD-10-CM

## 2024-11-12 DIAGNOSIS — Z87.11 PERSONAL HISTORY OF PEPTIC ULCER DISEASE: ICD-10-CM

## 2024-11-12 DIAGNOSIS — Z01.419 ENCOUNTER FOR GYNECOLOGICAL EXAMINATION (GENERAL) (ROUTINE) W/OUT ABNORMAL FINDINGS: ICD-10-CM

## 2024-11-12 DIAGNOSIS — Z01.818 ENCOUNTER FOR OTHER PREPROCEDURAL EXAMINATION: ICD-10-CM

## 2024-11-12 PROCEDURE — 87491 CHLMYD TRACH DNA AMP PROBE: CPT

## 2024-11-12 PROCEDURE — 87591 N.GONORRHOEAE DNA AMP PROB: CPT

## 2024-11-12 PROCEDURE — 87624 HPV HI-RISK TYP POOLED RSLT: CPT

## 2024-11-12 PROCEDURE — 87661 TRICHOMONAS VAGINALIS AMPLIF: CPT

## 2024-11-12 PROCEDURE — 87481 CANDIDA DNA AMP PROBE: CPT

## 2024-11-12 PROCEDURE — 99204 OFFICE O/P NEW MOD 45 MIN: CPT

## 2024-11-12 PROCEDURE — G0463: CPT

## 2024-11-12 PROCEDURE — 81513 NFCT DS BV RNA VAG FLU ALG: CPT

## 2024-11-13 DIAGNOSIS — N18.6 END STAGE RENAL DISEASE: ICD-10-CM

## 2024-11-13 DIAGNOSIS — Z12.39 ENCOUNTER FOR OTHER SCREENING FOR MALIGNANT NEOPLASM OF BREAST: ICD-10-CM

## 2024-11-13 DIAGNOSIS — Z01.419 ENCOUNTER FOR GYNECOLOGICAL EXAMINATION (GENERAL) (ROUTINE) WITHOUT ABNORMAL FINDINGS: ICD-10-CM

## 2024-11-13 DIAGNOSIS — Z78.9 OTHER SPECIFIED HEALTH STATUS: ICD-10-CM

## 2024-11-13 DIAGNOSIS — Z86.39 PERSONAL HISTORY OF OTHER ENDOCRINE, NUTRITIONAL AND METABOLIC DISEASE: ICD-10-CM

## 2024-11-13 DIAGNOSIS — Z78.0 ASYMPTOMATIC MENOPAUSAL STATE: ICD-10-CM

## 2024-11-13 DIAGNOSIS — Z11.3 ENCOUNTER FOR SCREENING FOR INFECTIONS WITH A PREDOMINANTLY SEXUAL MODE OF TRANSMISSION: ICD-10-CM

## 2024-11-13 DIAGNOSIS — N76.1 SUBACUTE AND CHRONIC VAGINITIS: ICD-10-CM

## 2024-11-13 DIAGNOSIS — Z12.4 ENCOUNTER FOR SCREENING FOR MALIGNANT NEOPLASM OF CERVIX: ICD-10-CM

## 2024-11-13 DIAGNOSIS — B96.89 ACUTE VAGINITIS: ICD-10-CM

## 2024-11-13 DIAGNOSIS — N76.0 ACUTE VAGINITIS: ICD-10-CM

## 2024-11-13 LAB
BV BACTERIA RRNA VAG QL NAA+PROBE: DETECTED
C GLABRATA RNA VAG QL NAA+PROBE: NOT DETECTED
C TRACH RRNA SPEC QL NAA+PROBE: NOT DETECTED
CANDIDA RRNA VAG QL PROBE: NOT DETECTED
HPV HIGH+LOW RISK DNA PNL CVX: NOT DETECTED
N GONORRHOEA RRNA SPEC QL NAA+PROBE: NOT DETECTED
SOURCE TP AMPLIFICATION: NORMAL
T VAGINALIS RRNA SPEC QL NAA+PROBE: NOT DETECTED

## 2024-11-13 RX ORDER — METRONIDAZOLE 7.5 MG/G
0.75 GEL VAGINAL
Qty: 1 | Refills: 0 | Status: ACTIVE | COMMUNITY
Start: 2024-11-13 | End: 1900-01-01

## 2024-11-14 ENCOUNTER — NON-APPOINTMENT (OUTPATIENT)
Age: 79
End: 2024-11-14

## 2024-11-18 ENCOUNTER — APPOINTMENT (OUTPATIENT)
Dept: ULTRASOUND IMAGING | Facility: CLINIC | Age: 79
End: 2024-11-18

## 2024-11-18 ENCOUNTER — APPOINTMENT (OUTPATIENT)
Dept: MAMMOGRAPHY | Facility: CLINIC | Age: 79
End: 2024-11-18

## 2024-11-19 LAB — CYTOLOGY CVX/VAG DOC THIN PREP: ABNORMAL

## 2024-12-18 ENCOUNTER — APPOINTMENT (OUTPATIENT)
Dept: CARDIOLOGY | Facility: CLINIC | Age: 79
End: 2024-12-18
Payer: COMMERCIAL

## 2024-12-18 PROCEDURE — 93306 TTE W/DOPPLER COMPLETE: CPT

## 2024-12-18 PROCEDURE — ZZZZZ: CPT

## 2024-12-20 ENCOUNTER — APPOINTMENT (OUTPATIENT)
Dept: INFECTIOUS DISEASE | Facility: CLINIC | Age: 79
End: 2024-12-20

## 2024-12-20 VITALS
WEIGHT: 144 LBS | HEIGHT: 64 IN | OXYGEN SATURATION: 98 % | DIASTOLIC BLOOD PRESSURE: 62 MMHG | RESPIRATION RATE: 16 BRPM | TEMPERATURE: 96.3 F | SYSTOLIC BLOOD PRESSURE: 139 MMHG | HEART RATE: 79 BPM | BODY MASS INDEX: 24.59 KG/M2

## 2024-12-20 DIAGNOSIS — Z23 ENCOUNTER FOR IMMUNIZATION: ICD-10-CM

## 2024-12-20 DIAGNOSIS — Z01.818 ENCOUNTER FOR OTHER PREPROCEDURAL EXAMINATION: ICD-10-CM

## 2024-12-20 PROCEDURE — G0010: CPT

## 2024-12-20 PROCEDURE — 90739 HEPB VACC 2/4 DOSE ADULT IM: CPT

## 2024-12-20 PROCEDURE — G0009: CPT

## 2024-12-20 PROCEDURE — 99204 OFFICE O/P NEW MOD 45 MIN: CPT | Mod: 25

## 2024-12-20 PROCEDURE — 90677 PCV20 VACCINE IM: CPT

## 2025-01-07 ENCOUNTER — APPOINTMENT (OUTPATIENT)
Dept: ENDOCRINOLOGY | Facility: CLINIC | Age: 80
End: 2025-01-07
Payer: MEDICARE

## 2025-01-07 VITALS
BODY MASS INDEX: 23.9 KG/M2 | HEART RATE: 81 BPM | OXYGEN SATURATION: 100 % | TEMPERATURE: 98.3 F | WEIGHT: 140 LBS | SYSTOLIC BLOOD PRESSURE: 111 MMHG | HEIGHT: 64 IN | DIASTOLIC BLOOD PRESSURE: 66 MMHG | RESPIRATION RATE: 16 BRPM

## 2025-01-07 DIAGNOSIS — E11.29 TYPE 2 DIABETES MELLITUS WITH OTHER DIABETIC KIDNEY COMPLICATION: ICD-10-CM

## 2025-01-07 DIAGNOSIS — N18.6 END STAGE RENAL DISEASE: ICD-10-CM

## 2025-01-07 DIAGNOSIS — Z99.2 END STAGE RENAL DISEASE: ICD-10-CM

## 2025-01-07 DIAGNOSIS — Z79.4 TYPE 2 DIABETES MELLITUS WITH OTHER DIABETIC KIDNEY COMPLICATION: ICD-10-CM

## 2025-01-07 PROCEDURE — 99214 OFFICE O/P EST MOD 30 MIN: CPT

## 2025-01-07 PROCEDURE — G2211 COMPLEX E/M VISIT ADD ON: CPT

## 2025-01-07 PROCEDURE — 83036 HEMOGLOBIN GLYCOSYLATED A1C: CPT | Mod: QW

## 2025-01-07 PROCEDURE — 82962 GLUCOSE BLOOD TEST: CPT

## 2025-01-08 LAB
GLUCOSE BLDC GLUCOMTR-MCNC: 205
HBA1C MFR BLD HPLC: 9.3

## 2025-01-15 ENCOUNTER — APPOINTMENT (OUTPATIENT)
Dept: CARDIOLOGY | Facility: CLINIC | Age: 80
End: 2025-01-15

## 2025-01-17 ENCOUNTER — NON-APPOINTMENT (OUTPATIENT)
Age: 80
End: 2025-01-17

## 2025-01-24 ENCOUNTER — APPOINTMENT (OUTPATIENT)
Dept: FAMILY MEDICINE | Facility: CLINIC | Age: 80
End: 2025-01-24
Payer: MEDICARE

## 2025-01-24 DIAGNOSIS — E11.29 TYPE 2 DIABETES MELLITUS WITH OTHER DIABETIC KIDNEY COMPLICATION: ICD-10-CM

## 2025-01-24 DIAGNOSIS — Z79.4 TYPE 2 DIABETES MELLITUS WITH OTHER DIABETIC KIDNEY COMPLICATION: ICD-10-CM

## 2025-01-24 PROCEDURE — 99213 OFFICE O/P EST LOW 20 MIN: CPT

## 2025-02-04 ENCOUNTER — RESULT REVIEW (OUTPATIENT)
Age: 80
End: 2025-02-04

## 2025-02-04 ENCOUNTER — APPOINTMENT (OUTPATIENT)
Dept: MAMMOGRAPHY | Facility: IMAGING CENTER | Age: 80
End: 2025-02-04
Payer: MEDICARE

## 2025-02-04 ENCOUNTER — OUTPATIENT (OUTPATIENT)
Dept: OUTPATIENT SERVICES | Facility: HOSPITAL | Age: 80
LOS: 1 days | End: 2025-02-04
Payer: MEDICARE

## 2025-02-04 ENCOUNTER — APPOINTMENT (OUTPATIENT)
Dept: ULTRASOUND IMAGING | Facility: IMAGING CENTER | Age: 80
End: 2025-02-04
Payer: MEDICARE

## 2025-02-04 DIAGNOSIS — Z90.49 ACQUIRED ABSENCE OF OTHER SPECIFIED PARTS OF DIGESTIVE TRACT: Chronic | ICD-10-CM

## 2025-02-04 DIAGNOSIS — Z98.51 TUBAL LIGATION STATUS: Chronic | ICD-10-CM

## 2025-02-04 DIAGNOSIS — Z12.39 ENCOUNTER FOR OTHER SCREENING FOR MALIGNANT NEOPLASM OF BREAST: ICD-10-CM

## 2025-02-04 PROCEDURE — 76641 ULTRASOUND BREAST COMPLETE: CPT | Mod: 26,50

## 2025-02-04 PROCEDURE — 77063 BREAST TOMOSYNTHESIS BI: CPT | Mod: 26

## 2025-02-04 PROCEDURE — 77067 SCR MAMMO BI INCL CAD: CPT

## 2025-02-04 PROCEDURE — 77067 SCR MAMMO BI INCL CAD: CPT | Mod: 26

## 2025-02-04 PROCEDURE — 76641 ULTRASOUND BREAST COMPLETE: CPT

## 2025-02-04 PROCEDURE — 77063 BREAST TOMOSYNTHESIS BI: CPT

## 2025-02-26 ENCOUNTER — APPOINTMENT (OUTPATIENT)
Dept: CARDIOLOGY | Facility: CLINIC | Age: 80
End: 2025-02-26
Payer: COMMERCIAL

## 2025-02-26 DIAGNOSIS — Z01.818 ENCOUNTER FOR OTHER PREPROCEDURAL EXAMINATION: ICD-10-CM

## 2025-02-26 PROCEDURE — A9500: CPT

## 2025-02-26 PROCEDURE — 93015 CV STRESS TEST SUPVJ I&R: CPT

## 2025-02-26 PROCEDURE — 78452 HT MUSCLE IMAGE SPECT MULT: CPT
